# Patient Record
Sex: MALE | Race: WHITE | NOT HISPANIC OR LATINO | Employment: FULL TIME | URBAN - METROPOLITAN AREA
[De-identification: names, ages, dates, MRNs, and addresses within clinical notes are randomized per-mention and may not be internally consistent; named-entity substitution may affect disease eponyms.]

---

## 2024-09-09 ENCOUNTER — HOSPITAL ENCOUNTER (OUTPATIENT)
Facility: HOSPITAL | Age: 44
Setting detail: OBSERVATION
Discharge: HOME | End: 2024-09-11
Attending: STUDENT IN AN ORGANIZED HEALTH CARE EDUCATION/TRAINING PROGRAM | Admitting: STUDENT IN AN ORGANIZED HEALTH CARE EDUCATION/TRAINING PROGRAM
Payer: COMMERCIAL

## 2024-09-09 DIAGNOSIS — J01.40 ACUTE NON-RECURRENT PANSINUSITIS: ICD-10-CM

## 2024-09-09 DIAGNOSIS — J32.4 PANSINUSITIS, UNSPECIFIED CHRONICITY: ICD-10-CM

## 2024-09-09 DIAGNOSIS — I16.0 HYPERTENSIVE URGENCY: Primary | ICD-10-CM

## 2024-09-09 DIAGNOSIS — H60.393 OTHER INFECTIVE ACUTE OTITIS EXTERNA OF BOTH EARS: ICD-10-CM

## 2024-09-09 LAB
ALBUMIN SERPL-MCNC: 4.5 G/DL (ref 3.5–5.2)
ALBUMIN/GLOB SERPL: 1.3 G/DL
ALP SERPL-CCNC: 86 U/L (ref 39–117)
ALT SERPL W P-5'-P-CCNC: 52 U/L (ref 1–41)
ANION GAP SERPL CALCULATED.3IONS-SCNC: 13.9 MMOL/L (ref 5–15)
AST SERPL-CCNC: 50 U/L (ref 1–40)
B PARAPERT DNA SPEC QL NAA+PROBE: NOT DETECTED
B PERT DNA SPEC QL NAA+PROBE: NOT DETECTED
BACTERIA UR QL AUTO: NORMAL /HPF
BASOPHILS # BLD AUTO: 0.03 10*3/MM3 (ref 0–0.2)
BASOPHILS NFR BLD AUTO: 0.3 % (ref 0–1.5)
BILIRUB SERPL-MCNC: 0.6 MG/DL (ref 0–1.2)
BILIRUB UR QL STRIP: NEGATIVE
BUN SERPL-MCNC: 11 MG/DL (ref 6–20)
BUN/CREAT SERPL: 9.9 (ref 7–25)
C PNEUM DNA NPH QL NAA+NON-PROBE: NOT DETECTED
CALCIUM SPEC-SCNC: 9.7 MG/DL (ref 8.6–10.5)
CHLORIDE SERPL-SCNC: 101 MMOL/L (ref 98–107)
CLARITY UR: CLEAR
CO2 SERPL-SCNC: 24.1 MMOL/L (ref 22–29)
COLOR UR: YELLOW
CREAT SERPL-MCNC: 1.11 MG/DL (ref 0.76–1.27)
DEPRECATED RDW RBC AUTO: 36.9 FL (ref 37–54)
EGFRCR SERPLBLD CKD-EPI 2021: 84.5 ML/MIN/1.73
EOSINOPHIL # BLD AUTO: 0.09 10*3/MM3 (ref 0–0.4)
EOSINOPHIL NFR BLD AUTO: 0.8 % (ref 0.3–6.2)
ERYTHROCYTE [DISTWIDTH] IN BLOOD BY AUTOMATED COUNT: 12.1 % (ref 12.3–15.4)
FLUAV SUBTYP SPEC NAA+PROBE: NOT DETECTED
FLUBV RNA ISLT QL NAA+PROBE: NOT DETECTED
GLOBULIN UR ELPH-MCNC: 3.4 GM/DL
GLUCOSE SERPL-MCNC: 101 MG/DL (ref 65–99)
GLUCOSE UR STRIP-MCNC: NEGATIVE MG/DL
HADV DNA SPEC NAA+PROBE: NOT DETECTED
HCOV 229E RNA SPEC QL NAA+PROBE: NOT DETECTED
HCOV HKU1 RNA SPEC QL NAA+PROBE: NOT DETECTED
HCOV NL63 RNA SPEC QL NAA+PROBE: NOT DETECTED
HCOV OC43 RNA SPEC QL NAA+PROBE: NOT DETECTED
HCT VFR BLD AUTO: 48.4 % (ref 37.5–51)
HGB BLD-MCNC: 16.9 G/DL (ref 13–17.7)
HGB UR QL STRIP.AUTO: ABNORMAL
HMPV RNA NPH QL NAA+NON-PROBE: NOT DETECTED
HOLD SPECIMEN: NORMAL
HOLD SPECIMEN: NORMAL
HPIV1 RNA ISLT QL NAA+PROBE: NOT DETECTED
HPIV2 RNA SPEC QL NAA+PROBE: NOT DETECTED
HPIV3 RNA NPH QL NAA+PROBE: NOT DETECTED
HPIV4 P GENE NPH QL NAA+PROBE: NOT DETECTED
HYALINE CASTS UR QL AUTO: NORMAL /LPF
IMM GRANULOCYTES # BLD AUTO: 0.03 10*3/MM3 (ref 0–0.05)
IMM GRANULOCYTES NFR BLD AUTO: 0.3 % (ref 0–0.5)
KETONES UR QL STRIP: NEGATIVE
LEUKOCYTE ESTERASE UR QL STRIP.AUTO: NEGATIVE
LYMPHOCYTES # BLD AUTO: 2.15 10*3/MM3 (ref 0.7–3.1)
LYMPHOCYTES NFR BLD AUTO: 19.9 % (ref 19.6–45.3)
M PNEUMO IGG SER IA-ACNC: NOT DETECTED
MAGNESIUM SERPL-MCNC: 1.9 MG/DL (ref 1.6–2.6)
MCH RBC QN AUTO: 29.6 PG (ref 26.6–33)
MCHC RBC AUTO-ENTMCNC: 34.9 G/DL (ref 31.5–35.7)
MCV RBC AUTO: 84.8 FL (ref 79–97)
MONOCYTES # BLD AUTO: 0.58 10*3/MM3 (ref 0.1–0.9)
MONOCYTES NFR BLD AUTO: 5.4 % (ref 5–12)
NEUTROPHILS NFR BLD AUTO: 7.93 10*3/MM3 (ref 1.7–7)
NEUTROPHILS NFR BLD AUTO: 73.3 % (ref 42.7–76)
NITRITE UR QL STRIP: NEGATIVE
NRBC BLD AUTO-RTO: 0 /100 WBC (ref 0–0.2)
NT-PROBNP SERPL-MCNC: 92.3 PG/ML (ref 0–450)
PH UR STRIP.AUTO: 6.5 [PH] (ref 5–8)
PLATELET # BLD AUTO: 287 10*3/MM3 (ref 140–450)
PMV BLD AUTO: 9.2 FL (ref 6–12)
POTASSIUM SERPL-SCNC: 3.6 MMOL/L (ref 3.5–5.2)
PROT SERPL-MCNC: 7.9 G/DL (ref 6–8.5)
PROT UR QL STRIP: NEGATIVE
RBC # BLD AUTO: 5.71 10*6/MM3 (ref 4.14–5.8)
RBC # UR STRIP: NORMAL /HPF
REF LAB TEST METHOD: NORMAL
RHINOVIRUS RNA SPEC NAA+PROBE: NOT DETECTED
RSV RNA NPH QL NAA+NON-PROBE: NOT DETECTED
SARS-COV-2 RNA NPH QL NAA+NON-PROBE: NOT DETECTED
SODIUM SERPL-SCNC: 139 MMOL/L (ref 136–145)
SP GR UR STRIP: <=1.005 (ref 1–1.03)
SQUAMOUS #/AREA URNS HPF: NORMAL /HPF
TSH SERPL DL<=0.05 MIU/L-ACNC: 1.7 UIU/ML (ref 0.27–4.2)
UROBILINOGEN UR QL STRIP: ABNORMAL
WBC # UR STRIP: NORMAL /HPF
WBC NRBC COR # BLD AUTO: 10.81 10*3/MM3 (ref 3.4–10.8)
WHOLE BLOOD HOLD COAG: NORMAL
WHOLE BLOOD HOLD SPECIMEN: NORMAL

## 2024-09-09 PROCEDURE — 84484 ASSAY OF TROPONIN QUANT: CPT | Performed by: NURSE PRACTITIONER

## 2024-09-09 PROCEDURE — 83880 ASSAY OF NATRIURETIC PEPTIDE: CPT

## 2024-09-09 PROCEDURE — 83735 ASSAY OF MAGNESIUM: CPT

## 2024-09-09 PROCEDURE — 36415 COLL VENOUS BLD VENIPUNCTURE: CPT

## 2024-09-09 PROCEDURE — 96375 TX/PRO/DX INJ NEW DRUG ADDON: CPT

## 2024-09-09 PROCEDURE — 99285 EMERGENCY DEPT VISIT HI MDM: CPT

## 2024-09-09 PROCEDURE — 0202U NFCT DS 22 TRGT SARS-COV-2: CPT

## 2024-09-09 PROCEDURE — 93005 ELECTROCARDIOGRAM TRACING: CPT | Performed by: STUDENT IN AN ORGANIZED HEALTH CARE EDUCATION/TRAINING PROGRAM

## 2024-09-09 PROCEDURE — 25010000002 LABETALOL 5 MG/ML SOLUTION

## 2024-09-09 PROCEDURE — 80050 GENERAL HEALTH PANEL: CPT

## 2024-09-09 PROCEDURE — 93005 ELECTROCARDIOGRAM TRACING: CPT

## 2024-09-09 PROCEDURE — 96376 TX/PRO/DX INJ SAME DRUG ADON: CPT

## 2024-09-09 PROCEDURE — 81001 URINALYSIS AUTO W/SCOPE: CPT

## 2024-09-09 RX ORDER — LABETALOL HYDROCHLORIDE 5 MG/ML
10 INJECTION, SOLUTION INTRAVENOUS ONCE
Status: COMPLETED | OUTPATIENT
Start: 2024-09-09 | End: 2024-09-09

## 2024-09-09 RX ORDER — SODIUM CHLORIDE 0.9 % (FLUSH) 0.9 %
10 SYRINGE (ML) INJECTION AS NEEDED
Status: DISCONTINUED | OUTPATIENT
Start: 2024-09-09 | End: 2024-09-11 | Stop reason: HOSPADM

## 2024-09-09 RX ORDER — LABETALOL HYDROCHLORIDE 5 MG/ML
20 INJECTION, SOLUTION INTRAVENOUS ONCE
Status: DISCONTINUED | OUTPATIENT
Start: 2024-09-09 | End: 2024-09-09

## 2024-09-09 RX ADMIN — LABETALOL HYDROCHLORIDE 10 MG: 5 INJECTION, SOLUTION INTRAVENOUS at 22:53

## 2024-09-09 RX ADMIN — LABETALOL HYDROCHLORIDE 10 MG: 5 INJECTION, SOLUTION INTRAVENOUS at 21:37

## 2024-09-09 NOTE — Clinical Note
Level of Care: Telemetry [5]   Admitting Physician: IKE QUICK [[id number]]   Attending Physician: IKE QUICK [[id number]]

## 2024-09-10 ENCOUNTER — APPOINTMENT (OUTPATIENT)
Dept: CARDIOLOGY | Facility: HOSPITAL | Age: 44
End: 2024-09-10
Payer: COMMERCIAL

## 2024-09-10 PROBLEM — I16.0 HYPERTENSIVE URGENCY: Status: ACTIVE | Noted: 2024-09-10

## 2024-09-10 LAB
AORTIC DIMENSIONLESS INDEX: 0.84 (DI)
BH CV ECHO MEAS - ACS: 2 CM
BH CV ECHO MEAS - AO MAX PG: 18.8 MMHG
BH CV ECHO MEAS - AO MEAN PG: 10 MMHG
BH CV ECHO MEAS - AO V2 MAX: 217 CM/SEC
BH CV ECHO MEAS - AO V2 VTI: 35 CM
BH CV ECHO MEAS - AVA(I,D): 2.6 CM2
BH CV ECHO MEAS - EDV(CUBED): 117.6 ML
BH CV ECHO MEAS - EDV(MOD-SP4): 146 ML
BH CV ECHO MEAS - EF(MOD-BP): 54 %
BH CV ECHO MEAS - EF(MOD-SP4): 53.8 %
BH CV ECHO MEAS - ESV(CUBED): 29.8 ML
BH CV ECHO MEAS - ESV(MOD-SP4): 67.4 ML
BH CV ECHO MEAS - FS: 36.7 %
BH CV ECHO MEAS - IVS/LVPW: 1.1 CM
BH CV ECHO MEAS - IVSD: 1.1 CM
BH CV ECHO MEAS - LA DIMENSION: 3.9 CM
BH CV ECHO MEAS - LAT PEAK E' VEL: 8.2 CM/SEC
BH CV ECHO MEAS - LV DIASTOLIC VOL/BSA (35-75): 70.4 CM2
BH CV ECHO MEAS - LV MASS(C)D: 188.1 GRAMS
BH CV ECHO MEAS - LV MAX PG: 13.2 MMHG
BH CV ECHO MEAS - LV MEAN PG: 7 MMHG
BH CV ECHO MEAS - LV SYSTOLIC VOL/BSA (12-30): 32.5 CM2
BH CV ECHO MEAS - LV V1 MAX: 182 CM/SEC
BH CV ECHO MEAS - LV V1 VTI: 31.8 CM
BH CV ECHO MEAS - LVIDD: 4.9 CM
BH CV ECHO MEAS - LVIDS: 3.1 CM
BH CV ECHO MEAS - LVOT AREA: 2.8 CM2
BH CV ECHO MEAS - LVOT DIAM: 1.9 CM
BH CV ECHO MEAS - LVPWD: 1 CM
BH CV ECHO MEAS - MED PEAK E' VEL: 7.2 CM/SEC
BH CV ECHO MEAS - MV A MAX VEL: 96.1 CM/SEC
BH CV ECHO MEAS - MV DEC SLOPE: 349 CM/SEC2
BH CV ECHO MEAS - MV DEC TIME: 0.19 SEC
BH CV ECHO MEAS - MV E MAX VEL: 61 CM/SEC
BH CV ECHO MEAS - MV E/A: 0.63
BH CV ECHO MEAS - MV MAX PG: 4.6 MMHG
BH CV ECHO MEAS - MV MEAN PG: 2 MMHG
BH CV ECHO MEAS - MV P1/2T: 87.3 MSEC
BH CV ECHO MEAS - MV V2 VTI: 26.9 CM
BH CV ECHO MEAS - MVA(P1/2T): 2.5 CM2
BH CV ECHO MEAS - MVA(VTI): 3.4 CM2
BH CV ECHO MEAS - PA ACC TIME: 0.12 SEC
BH CV ECHO MEAS - PA V2 MAX: 150 CM/SEC
BH CV ECHO MEAS - RV MAX PG: 4.6 MMHG
BH CV ECHO MEAS - RV V1 MAX: 107 CM/SEC
BH CV ECHO MEAS - RV V1 VTI: 18.3 CM
BH CV ECHO MEAS - RVDD: 3.9 CM
BH CV ECHO MEAS - SV(LVOT): 90.2 ML
BH CV ECHO MEAS - SV(MOD-SP4): 78.6 ML
BH CV ECHO MEAS - SVI(LVOT): 43.5 ML/M2
BH CV ECHO MEAS - SVI(MOD-SP4): 37.9 ML/M2
BH CV ECHO MEAS - TAPSE (>1.6): 2.34 CM
BH CV ECHO MEASUREMENTS AVERAGE E/E' RATIO: 7.92
BH CV XLRA - TDI S': 23 CM/SEC
LEFT ATRIUM VOLUME INDEX: 25.7 ML/M2
POTASSIUM SERPL-SCNC: 3.7 MMOL/L (ref 3.5–5.2)
QT INTERVAL: 379 MS
QTC INTERVAL: 486 MS
SINUS: 2.9 CM
STJ: 2.6 CM
TROPONIN T SERPL HS-MCNC: 13 NG/L

## 2024-09-10 PROCEDURE — G0378 HOSPITAL OBSERVATION PER HR: HCPCS

## 2024-09-10 PROCEDURE — 93306 TTE W/DOPPLER COMPLETE: CPT | Performed by: INTERNAL MEDICINE

## 2024-09-10 PROCEDURE — 93306 TTE W/DOPPLER COMPLETE: CPT

## 2024-09-10 PROCEDURE — 25010000002 NICARDIPINE 2.5 MG/ML SOLUTION 10 ML VIAL

## 2024-09-10 PROCEDURE — 96365 THER/PROPH/DIAG IV INF INIT: CPT

## 2024-09-10 PROCEDURE — 96366 THER/PROPH/DIAG IV INF ADDON: CPT

## 2024-09-10 PROCEDURE — 25810000003 SODIUM CHLORIDE 0.9 % SOLUTION 250 ML FLEX CONT

## 2024-09-10 PROCEDURE — 84132 ASSAY OF SERUM POTASSIUM: CPT | Performed by: INTERNAL MEDICINE

## 2024-09-10 RX ORDER — ONDANSETRON 2 MG/ML
4 INJECTION INTRAMUSCULAR; INTRAVENOUS EVERY 6 HOURS PRN
Status: DISCONTINUED | OUTPATIENT
Start: 2024-09-10 | End: 2024-09-11 | Stop reason: HOSPADM

## 2024-09-10 RX ORDER — HYDRALAZINE HYDROCHLORIDE 25 MG/1
25 TABLET, FILM COATED ORAL EVERY 8 HOURS SCHEDULED
Status: DISCONTINUED | OUTPATIENT
Start: 2024-09-10 | End: 2024-09-10

## 2024-09-10 RX ORDER — POTASSIUM CHLORIDE 1500 MG/1
40 TABLET, EXTENDED RELEASE ORAL EVERY 4 HOURS
Status: COMPLETED | OUTPATIENT
Start: 2024-09-10 | End: 2024-09-10

## 2024-09-10 RX ORDER — LOSARTAN POTASSIUM 50 MG/1
50 TABLET ORAL
Status: DISCONTINUED | OUTPATIENT
Start: 2024-09-10 | End: 2024-09-11 | Stop reason: HOSPADM

## 2024-09-10 RX ORDER — SODIUM CHLORIDE 0.9 % (FLUSH) 0.9 %
10 SYRINGE (ML) INJECTION AS NEEDED
Status: DISCONTINUED | OUTPATIENT
Start: 2024-09-10 | End: 2024-09-11 | Stop reason: HOSPADM

## 2024-09-10 RX ORDER — ONDANSETRON 4 MG/1
4 TABLET, ORALLY DISINTEGRATING ORAL EVERY 6 HOURS PRN
Status: DISCONTINUED | OUTPATIENT
Start: 2024-09-10 | End: 2024-09-11 | Stop reason: HOSPADM

## 2024-09-10 RX ORDER — POLYETHYLENE GLYCOL 3350 17 G/17G
17 POWDER, FOR SOLUTION ORAL DAILY PRN
Status: DISCONTINUED | OUTPATIENT
Start: 2024-09-10 | End: 2024-09-11 | Stop reason: HOSPADM

## 2024-09-10 RX ORDER — METOPROLOL SUCCINATE 50 MG/1
50 TABLET, EXTENDED RELEASE ORAL
Status: DISCONTINUED | OUTPATIENT
Start: 2024-09-10 | End: 2024-09-11 | Stop reason: HOSPADM

## 2024-09-10 RX ORDER — AMOXICILLIN 250 MG
2 CAPSULE ORAL 2 TIMES DAILY PRN
Status: DISCONTINUED | OUTPATIENT
Start: 2024-09-10 | End: 2024-09-11 | Stop reason: HOSPADM

## 2024-09-10 RX ORDER — ALUMINA, MAGNESIA, AND SIMETHICONE 2400; 2400; 240 MG/30ML; MG/30ML; MG/30ML
15 SUSPENSION ORAL EVERY 6 HOURS PRN
Status: DISCONTINUED | OUTPATIENT
Start: 2024-09-10 | End: 2024-09-11 | Stop reason: HOSPADM

## 2024-09-10 RX ORDER — BISACODYL 10 MG
10 SUPPOSITORY, RECTAL RECTAL DAILY PRN
Status: DISCONTINUED | OUTPATIENT
Start: 2024-09-10 | End: 2024-09-11 | Stop reason: HOSPADM

## 2024-09-10 RX ORDER — OXYMETAZOLINE HYDROCHLORIDE 0.05 G/100ML
2 SPRAY NASAL 2 TIMES DAILY
Status: DISCONTINUED | OUTPATIENT
Start: 2024-09-10 | End: 2024-09-11 | Stop reason: HOSPADM

## 2024-09-10 RX ORDER — ACETAMINOPHEN 325 MG/1
650 TABLET ORAL EVERY 6 HOURS PRN
Status: DISCONTINUED | OUTPATIENT
Start: 2024-09-10 | End: 2024-09-11 | Stop reason: HOSPADM

## 2024-09-10 RX ORDER — SODIUM CHLORIDE 0.9 % (FLUSH) 0.9 %
10 SYRINGE (ML) INJECTION EVERY 12 HOURS SCHEDULED
Status: DISCONTINUED | OUTPATIENT
Start: 2024-09-10 | End: 2024-09-11 | Stop reason: HOSPADM

## 2024-09-10 RX ORDER — AMLODIPINE BESYLATE 5 MG/1
5 TABLET ORAL
Status: DISCONTINUED | OUTPATIENT
Start: 2024-09-10 | End: 2024-09-11 | Stop reason: HOSPADM

## 2024-09-10 RX ORDER — SODIUM CHLORIDE 9 MG/ML
40 INJECTION, SOLUTION INTRAVENOUS AS NEEDED
Status: DISCONTINUED | OUTPATIENT
Start: 2024-09-10 | End: 2024-09-11 | Stop reason: HOSPADM

## 2024-09-10 RX ORDER — BISACODYL 5 MG/1
5 TABLET, DELAYED RELEASE ORAL DAILY PRN
Status: DISCONTINUED | OUTPATIENT
Start: 2024-09-10 | End: 2024-09-11 | Stop reason: HOSPADM

## 2024-09-10 RX ADMIN — METOPROLOL SUCCINATE 50 MG: 50 TABLET, EXTENDED RELEASE ORAL at 16:12

## 2024-09-10 RX ADMIN — POTASSIUM CHLORIDE 40 MEQ: 1500 TABLET, EXTENDED RELEASE ORAL at 04:43

## 2024-09-10 RX ADMIN — NICARDIPINE HYDROCHLORIDE 7.5 MG/HR: 25 INJECTION, SOLUTION INTRAVENOUS at 13:35

## 2024-09-10 RX ADMIN — NICARDIPINE HYDROCHLORIDE 10 MG/HR: 25 INJECTION, SOLUTION INTRAVENOUS at 02:39

## 2024-09-10 RX ADMIN — NICARDIPINE HYDROCHLORIDE 10 MG/HR: 25 INJECTION, SOLUTION INTRAVENOUS at 04:43

## 2024-09-10 RX ADMIN — Medication 10 ML: at 08:30

## 2024-09-10 RX ADMIN — OXYMETAZOLINE HYDROCHLORIDE 2 SPRAY: 0.5 SPRAY NASAL at 21:27

## 2024-09-10 RX ADMIN — OXYMETAZOLINE HYDROCHLORIDE 2 SPRAY: 0.5 SPRAY NASAL at 13:10

## 2024-09-10 RX ADMIN — Medication 10 ML: at 04:45

## 2024-09-10 RX ADMIN — NICARDIPINE HYDROCHLORIDE 7.5 MG/HR: 25 INJECTION, SOLUTION INTRAVENOUS at 09:48

## 2024-09-10 RX ADMIN — POTASSIUM CHLORIDE 40 MEQ: 1500 TABLET, EXTENDED RELEASE ORAL at 08:30

## 2024-09-10 RX ADMIN — NICARDIPINE HYDROCHLORIDE 5 MG/HR: 25 INJECTION, SOLUTION INTRAVENOUS at 18:24

## 2024-09-10 RX ADMIN — AMLODIPINE BESYLATE 5 MG: 5 TABLET ORAL at 08:49

## 2024-09-10 RX ADMIN — AMOXICILLIN AND CLAVULANATE POTASSIUM 1 TABLET: 875; 125 TABLET, FILM COATED ORAL at 08:30

## 2024-09-10 RX ADMIN — NICARDIPINE HYDROCHLORIDE 5 MG/HR: 25 INJECTION, SOLUTION INTRAVENOUS at 00:11

## 2024-09-10 RX ADMIN — ACETAMINOPHEN 650 MG: 325 TABLET, FILM COATED ORAL at 16:46

## 2024-09-10 RX ADMIN — LOSARTAN POTASSIUM 50 MG: 50 TABLET, FILM COATED ORAL at 08:49

## 2024-09-10 RX ADMIN — Medication 10 ML: at 21:27

## 2024-09-10 RX ADMIN — AMOXICILLIN AND CLAVULANATE POTASSIUM 1 TABLET: 875; 125 TABLET, FILM COATED ORAL at 21:26

## 2024-09-10 NOTE — PLAN OF CARE
Goal Outcome Evaluation:      Patient arrived to the unit around 0130 on a Cardene drip. Patient has done well through the night with no complaints of pain or headaches. Pt a/ox4, 2L NC when sleeping, and Cardene gtt currently infusing at 5mg/hr with systolics in the 120's

## 2024-09-10 NOTE — H&P
[facility]    Hospitalist History and Physical     Smith Fletcher : 1980 MRN:[id number] LOS:0 ROOM:      Reason for admission: Hypertensive urgency     Assessment / Plan     Hypertensive urgency  - BP initially 246/129  - given 2 doses of labetalol without improvement  - cardene drip started in the ED  - EKG NSR  - check troponin, check echo     Mildly elevated liver enzymes  - AST 50, ALT 52  - will need outpatient monitoring    Sinusitis  - WBC 10.81  - on augmentin    Level Of Support Discussed With: Patient  Code Status (Patient has no pulse and is not breathing): CPR (Attempt to Resuscitate)  Medical Interventions (Patient has pulse or is breathing): Full Support       Nutrition:   Diet: Regular/House; Fluid Consistency: Thin (IDDSI 0)     VTE Prophylaxis:  Mechanical VTE prophylaxis orders are present.         History of Present illness     Smith Fletcher is a 44 y.o. male with no past medical history who does not see a PCP presented to urgent care with frontal sinus headache and congestion and was found to be hypertensive. He denied any dizziness, chest pain, shortness of breath. He has no history of hypertension but both his mother and father have hypertension. He denied any smoking, alcohol use, or drug use.     In the ED patient was found to be hypertensive 246/129. He was given labetalol 10mg x2 doses with little to no improvement 200/117. He was started on a cardene drip and admitted for further treatment of hypertensive urgency.     Subjective / Review of systems     Review of Systems   Constitutional: Negative.    HENT:  Positive for congestion, postnasal drip, sinus pressure and sinus pain.    Eyes: Negative.    Respiratory: Negative.     Cardiovascular: Negative.    Gastrointestinal: Negative.    Genitourinary: Negative.    Musculoskeletal: Negative.    Skin: Negative.    Neurological:  Positive for headaches.   Psychiatric/Behavioral: Negative.          Past  Medical/Surgical/Social/Family History & Allergies     No past medical history on file.   Past Surgical History:   Procedure Laterality Date    WISDOM TOOTH EXTRACTION        Social History     Socioeconomic History    Marital status:    Tobacco Use    Smoking status: Never     Passive exposure: Never    Smokeless tobacco: Never   Vaping Use    Vaping status: Never Used   Substance and Sexual Activity    Alcohol use: Never    Drug use: Never    Sexual activity: Defer      No family history on file.   Allergies   Allergen Reactions    Codeine Nausea And Vomiting      Social Determinants of Health     Tobacco Use: Low Risk  (9/9/2024)    Patient History     Smoking Tobacco Use: Never     Smokeless Tobacco Use: Never     Passive Exposure: Never   Alcohol Use: Not on file   Financial Resource Strain: Not on file   Food Insecurity: Not on file   Transportation Needs: Not on file   Physical Activity: Not on file   Stress: Not on file   Social Connections: Unknown (9/10/2024)    Family and Community Support     Help with Day-to-Day Activities: Not on file     Lonely or Isolated: Not on file   Interpersonal Safety: Not At Risk (9/9/2024)    Abuse Screen     Unsafe at Home or Work/School: no     Feels Threatened by Someone?: no     Does Anyone Keep You from Contacting Others or Doint Things Outside the Home?: no     Physical Sign of Abuse Present: no   Depression: Not on file   Housing Stability: Unknown (9/10/2024)    Housing Stability     Current Living Arrangements: Not on file     Potentially Unsafe Housing Conditions: Not on file   Utilities: Not on file   Health Literacy: Unknown (9/10/2024)    Education     Help with school or training?: Not on file     Preferred Language: Not on file   Employment: Unknown (9/10/2024)    Employment     Do you want help finding or keeping work or a job?: Not on file   Disabilities: Unknown (9/10/2024)    Disabilities     Concentrating, Remembering, or Making Decisions  Difficulty: Not on file     Doing Errands Independently Difficulty: Not on file        Home Medications     Prior to Admission medications    Medication Sig Start Date End Date Taking? Authorizing Provider   amoxicillin-clavulanate (AUGMENTIN) 875-125 MG per tablet Take 1 tablet by mouth 2 (Two) Times a Day for 10 days. 9/9/24 9/19/24  Jess Domingo APRN        Objective / Physical Exam     Vital signs:  Temp: 98.3 °F (36.8 °C)  BP: (!) 191/113  Heart Rate: 89  Resp: 18  SpO2: 97 %  Weight: 98.9 kg (218 lb 0.6 oz)    Admission Weight: Weight: 98.9 kg (218 lb 0.6 oz)    Physical Exam  Vitals and nursing note reviewed.   HENT:      Head: Normocephalic and atraumatic.   Eyes:      Extraocular Movements: Extraocular movements intact.      Pupils: Pupils are equal, round, and reactive to light.   Cardiovascular:      Rate and Rhythm: Normal rate and regular rhythm.      Pulses: Normal pulses.      Heart sounds: Normal heart sounds.   Pulmonary:      Effort: Pulmonary effort is normal.      Breath sounds: Normal breath sounds.   Abdominal:      General: Bowel sounds are normal.      Palpations: Abdomen is soft.      Tenderness: There is no abdominal tenderness.   Musculoskeletal:         General: Normal range of motion.   Skin:     General: Skin is warm and dry.   Neurological:      Mental Status: He is alert and oriented to person, place, and time.   Psychiatric:         Mood and Affect: Mood normal.         Behavior: Behavior normal.          Labs     Results from last 7 days   Lab Units 09/09/24 2032   WBC 10*3/mm3 10.81*   HEMOGLOBIN g/dL 16.9   HEMATOCRIT % 48.4   PLATELETS 10*3/mm3 287      Results from last 7 days   Lab Units 09/09/24 2032   ALK PHOS U/L 86   AST (SGOT) U/L 50*   ALT (SGPT) U/L 52*           Results from last 7 days   Lab Units 09/09/24 2032   SODIUM mmol/L 139   POTASSIUM mmol/L 3.6   CHLORIDE mmol/L 101   CO2 mmol/L 24.1   BUN mg/dL 11   CREATININE mg/dL 1.11   GLUCOSE mg/dL 101*         Imaging     XR Chest 2 View    Result Date: 9/9/2024  XR CHEST 2 VW Date of Exam: 9/9/2024 6:13 PM EDT Indication: cough with wheezing Comparison: None available. Findings: The lungs are clear. The heart and mediastinal contours appear normal. There is no pleural effusion. The pulmonary vasculature appears normal. The osseous structures appear intact.     Impression: No acute cardiopulmonary process. Electronically Signed: Lev Zaldivar MD  9/9/2024 6:53 PM EDT  Workstation ID: [id number]        Current Medications     Scheduled Meds:  amoxicillin-clavulanate, 1 tablet, Oral, BID  sodium chloride, 10 mL, Intravenous, Q12H         Continuous Infusions:  niCARdipine, 5-15 mg/hr, Last Rate: 7.5 mg/hr (09/10/24 0105)           Leilani Esposito [facility]  09/10/24   01:31 EDT

## 2024-09-10 NOTE — PLAN OF CARE
Goal Outcome Evaluation:      Pt resting with VSS. Requested tylenol at 16:45 d/t headache that worsened with coughing. Tylenol administered, dr notified of slightly elevated ALT and AST levels. Pt on cardene gtt infusing at 5 which is down from 7.5 previously during the shift. Multiple HTN meds were added to pt med list and include amlodipine, metoprolol succinate XL and losartan. Pt newest BP is 130/87. He had an echo completed this morning.                                           (0) swallows foods/liquids without difficulty

## 2024-09-10 NOTE — PROGRESS NOTES
[facility] MEDICINE SERVICE  DAILY PROGRESS NOTE    NAME: Smith Fletcher  : 1980  MRN: [id number]      LOS: 0 days     PROVIDER OF SERVICE: Jun Hardy MD    Chief Complaint: Hypertensive urgency    Subjective:     Interval History: Patient states that overall he feels better although he still has significant sinus congestion and head pressure.  Denies any chest pain or shortness of breath currently.        Review of Systems:   Review of Systems    Objective:     Vital Signs  Temp:  [97.3 °F (36.3 °C)-98.4 °F (36.9 °C)] 98.2 °F (36.8 °C)  Heart Rate:  [] 79  Resp:  [15-20] 18  BP: (152-246)/() 152/90  Flow (L/min):  [2] 2   Body mass index is 35.19 kg/m².    Physical Exam  Physical Exam  General Appearance:  Alert, cooperative, no distress, appears stated age  Head:  Normocephalic, without obvious abnormality, atraumatic  Eyes:  PERRL, conjunctiva/corneas clear, EOM's intact, fundi benign, both eyes  Ears:  Normal TM's and external ear canals, both ears  Nose: Nares normal, septum midline, mucosa normal, no drainage or sinus tenderness  Throat: Lips, mucosa, and tongue normal; teeth and gums normal  Neck: Supple, symmetrical, trachea midline, no adenopathy, thyroid: not enlarged, symmetric, no tenderness/mass/nodules, no carotid bruit or JVD  Lungs:   Clear to auscultation bilaterally, respirations unlabored  Heart:  Regular rate and rhythm, S1, S2 normal, no murmur, rub or gallop  Abdomen:  Soft, non-tender, bowel sounds active all four quadrants,  no masses, no organomegaly  Extremities: Extremities normal, atraumatic, no cyanosis or edema  Pulses: 2+ and symmetric  Skin: Skin color, texture, turgor normal, no rashes or lesions  Neurologic: Normal         Diagnostic Data    Results from last 7 days   Lab Units 09/10/24  1306 242   WBC 10*3/mm3  --  10.81*   HEMOGLOBIN g/dL  --  16.9   HEMATOCRIT %  --  48.4   PLATELETS 10*3/mm3  --  287   GLUCOSE mg/dL  --  101*    CREATININE mg/dL  --  1.11   BUN mg/dL  --  11   SODIUM mmol/L  --  139   POTASSIUM mmol/L 3.7 3.6   AST (SGOT) U/L  --  50*   ALT (SGPT) U/L  --  52*   ALK PHOS U/L  --  86   BILIRUBIN mg/dL  --  0.6   ANION GAP mmol/L  --  13.9       XR Chest 2 View    Result Date: 9/9/2024  Impression: No acute cardiopulmonary process. Electronically Signed: Lev Zaldivar MD  9/9/2024 6:53 PM EDT  Workstation ID: [id number]       I reviewed the patient's new clinical results.    Assessment/Plan:     Active and Resolved Problems  Active Hospital Problems    Diagnosis  POA    **Hypertensive urgency [I16.0]  Yes      Resolved Hospital Problems   No resolved problems to display.       Uncontrolled hypertension with hypertensive urgency  -Initiated on Cardene drip in the ER for systolic blood pressure over 250  -Will initiate him on oral antiparkinson medications with losartan, amlodipine and possibly Toprol-XL as I anticipate he will need at least 3 medications for BP control  -Wean Cardene drip as tolerated  -Echocardiogram shows preserved EF with diastolic dysfunction    Mildly elevated transaminases  -Possibly related to hypertension versus fatty liver disease  -No acute issues at this time although would benefit from outpatient monitoring    Acute sinusitis  -Started on oral Augmentin  -Provide patient with Afrin nasal spray for decongestion        VTE Prophylaxis:  Mechanical VTE prophylaxis orders are present.             Disposition Planning:     Barriers to Discharge: Control hypertension and wean off Cardene drip  Anticipated Date of Discharge: 9/11  Place of Discharge: Home      Time: 40 minutes     Code Status and Medical Interventions: CPR (Attempt to Resuscitate); Full Support   Ordered at: 09/10/24 0130     Level Of Support Discussed With:    Patient     Code Status (Patient has no pulse and is not breathing):    CPR (Attempt to Resuscitate)     Medical Interventions (Patient has pulse or is breathing):    Full  Support       Signature: Electronically signed by Jun Hardy MD, 09/10/24, 14:09 EDT.  [facility] Hospitalist Team

## 2024-09-10 NOTE — ED NOTES
Nursing report ED to floor  Smith Fletcher  44 y.o.  male    HPI:   Chief Complaint   Patient presents with    Hypertension     Pt was seen at [facility] [place] earlier today for c/o cough, congestion. Pt had vitals taken while there and had very high BP-taken 3 times. Pt denies h/o or taking any meds for BP. Pt had CXR, covid and flu swab done with negative results       Admitting doctor:   Jonathan Medina DO    Admitting diagnosis:   The primary encounter diagnosis was Hypertensive urgency. A diagnosis of Pansinusitis, unspecified chronicity was also pertinent to this visit.    Code status:   Current Code Status       Date Active Code Status Order ID Comments User Context       9/10/2024 0130 CPR (Attempt to Resuscitate) [id number]  Leilani Esposito APRN ED        Question Answer    Code Status (Patient has no pulse and is not breathing) CPR (Attempt to Resuscitate)    Medical Interventions (Patient has pulse or is breathing) Full Support    Level Of Support Discussed With Patient                    Allergies:   Codeine    Isolation:  No active isolations     Fall Risk:  Fall Risk Assessment was completed, and patient is at low risk for falls.   Predictive Model Details         5 (Low) Factor Value    Calculated 9/10/2024 01:51 Age 44    Risk of Fall Model Active Peripheral IV Present     Respiratory Rate 18     Magnesium 1.9 mg/dL     Number of Distinct Medication Classes administered 4     Goldy Scale not on file     Clinically Relevant Sex Not Female     Chloride 101 mmol/L     ALT 52 U/L     Total Bilirubin 0.6 mg/dL     Days after Admission 0.239     Diastolic      Creatinine 1.11 mg/dL     Albumin 4.5 g/dL     Calcium 9.7 mg/dL     Potassium 3.6 mmol/L         Weight:       09/09/24 1958   Weight: 98.9 kg (218 lb 0.6 oz)       Intake and Output  No intake or output data in the 24 hours ending 09/10/24 0159    Diet:   Dietary Orders (From admission, onward)       Start     Ordered    09/10/24 0130  Diet:  Regular/House; Fluid Consistency: Thin (IDDSI 0)  Diet Effective Now        References:    Diet Order Crosswalk   Question Answer Comment   Diets: Regular/House    Fluid Consistency: Thin (IDDSI 0)        09/10/24 0130                     Most recent vitals:   Vitals:    09/09/24 2316 09/10/24 0016 09/10/24 0101 09/10/24 0102   BP: (!) 212/123 (!) 214/125 (!) 191/113    BP Location:   Right arm    Patient Position:   Sitting    Pulse: 74 73 89    Resp:   18    Temp:       TempSrc:       SpO2: 97% 98% 97% 97%   Weight:       Height:           Active LDAs/IV Access:   Lines, Drains & Airways       Active LDAs       Name Placement date Placement time Site Days    Peripheral IV 09/09/24 2137 Left Antecubital 09/09/24 2137  Antecubital  less than 1                    Skin Condition:   Skin Assessments (last day)       None             Labs (abnormal labs have a star):   Labs Reviewed   COMPREHENSIVE METABOLIC PANEL - Abnormal; Notable for the following components:       Result Value    Glucose 101 (*)     ALT (SGPT) 52 (*)     AST (SGOT) 50 (*)     All other components within normal limits    Narrative:     GFR Normal >60  Chronic Kidney Disease <60  Kidney Failure <15     URINALYSIS W/ MICROSCOPIC IF INDICATED (NO CULTURE) - Abnormal; Notable for the following components:    Blood, UA Trace (*)     All other components within normal limits   CBC WITH AUTO DIFFERENTIAL - Abnormal; Notable for the following components:    WBC 10.81 (*)     RDW 12.1 (*)     RDW-SD 36.9 (*)     Neutrophils, Absolute 7.93 (*)     All other components within normal limits   RESPIRATORY PANEL PCR W/ COVID-19 (SARS-COV-2), NP SWAB IN UTM/VTP, 2 HR TAT - Normal    Narrative:     In the setting of a positive respiratory panel with a viral infection PLUS a negative procalcitonin without other underlying concern for bacterial infection, consider observing off antibiotics or discontinuation of antibiotics and continue supportive care. If the  respiratory panel is positive for atypical bacterial infection (Bordetella pertussis, Chlamydophila pneumoniae, or Mycoplasma pneumoniae), consider antibiotic de-escalation to target atypical bacterial infection.   BNP (IN-HOUSE) - Normal    Narrative:     This assay is used as an aid in the diagnosis of individuals suspected of having heart failure. It can be used as an aid in the diagnosis of acute decompensated heart failure (ADHF) in patients presenting with signs and symptoms of ADHF to the emergency department (ED). In addition, NT-proBNP of <300 pg/mL indicates ADHF is not likely.    Age Range Result Interpretation  NT-proBNP Concentration (pg/mL:      <50             Positive            >450                   Gray                 300-450                    Negative             <300    50-75           Positive            >900                  Gray                300-900                  Negative            <300      >75             Positive            >1800                  Gray                300-1800                  Negative            <300   MAGNESIUM - Normal   TSH - Normal   TROPONIN - Normal    Narrative:     High Sensitive Troponin T Reference Range:  <14.0 ng/L- Negative Female for AMI  <22.0 ng/L- Negative Male for AMI  >=14 - Abnormal Female indicating possible myocardial injury.  >=22 - Abnormal Male indicating possible myocardial injury.   Clinicians would have to utilize clinical acumen, EKG, Troponin, and serial changes to determine if it is an Acute Myocardial Infarction or myocardial injury due to an underlying chronic condition.        RAINBOW DRAW    Narrative:     The following orders were created for panel order [place] Draw.  Procedure                               Abnormality         Status                     ---------                               -----------         ------                     Green Top (Gel)[[id number]]                                  Final result                Lavender Top[[id number]]                                     Final result               Gold Top - SST[[id number]]                                   Final result               Light Blue Top[[id number]]                                   Final result                 Please view results for these tests on the individual orders.   URINALYSIS, MICROSCOPIC ONLY   HIGH SENSITIVITIY TROPONIN T 2HR   GREEN TOP   LAVENDER TOP   GOLD TOP - SST   LIGHT BLUE TOP   CBC AND DIFFERENTIAL    Narrative:     The following orders were created for panel order CBC & Differential.  Procedure                               Abnormality         Status                     ---------                               -----------         ------                     CBC Auto Differential[[id number]]        Abnormal            Final result                 Please view results for these tests on the individual orders.       LOC: Person, Place, Time, and Situation    Telemetry:  Telemetry    Cardiac Monitoring Ordered: yes    EKG:   ECG 12 Lead Other; HTN   Preliminary Result   HEART RATE=99  bpm   RR Suiuresg=309  ms   TX Huuuixbo=690  ms   P Horizontal Axis=-16  deg   P Front Axis=49  deg   QRSD Lkqwenkc=524  ms   QT Tahglqnk=889  ms   BVrP=695  ms   QRS Axis=68  deg   T Wave Axis=-22  deg   - NORMAL ECG -   Sinus rhythm   Date and Time of Study:2024-09-09 20:03:43      Telemetry Scan   Final Result      Telemetry Scan   Final Result          Medications Given in the ED:   Medications   sodium chloride 0.9 % flush 10 mL (has no administration in time range)   niCARdipine (CARDENE) 25mg in 250mL NS infusion (7.5 mg/hr Intravenous Rate/Dose Change 9/10/24 0105)   amoxicillin-clavulanate (AUGMENTIN) 875-125 MG per tablet 1 tablet (has no administration in time range)   sodium chloride 0.9 % flush 10 mL (has no administration in time range)   sodium chloride 0.9 % flush 10 mL (has no administration in time range)   sodium chloride 0.9 % infusion 40 mL (has no  administration in time range)   aluminum-magnesium hydroxide-simethicone (MAALOX MAX) 400-400-40 MG/5ML suspension 15 mL (has no administration in time range)   ondansetron ODT (ZOFRAN-ODT) disintegrating tablet 4 mg (has no administration in time range)     Or   ondansetron (ZOFRAN) injection 4 mg (has no administration in time range)   Potassium Replacement - Follow Nurse / BPA Driven Protocol (has no administration in time range)   Magnesium Standard Dose Replacement - Follow Nurse / BPA Driven Protocol (has no administration in time range)   Phosphorus Replacement - Follow Nurse / BPA Driven Protocol (has no administration in time range)   Calcium Replacement - Follow Nurse / BPA Driven Protocol (has no administration in time range)   sennosides-docusate (PERICOLACE) 8.6-50 MG per tablet 2 tablet (has no administration in time range)     And   polyethylene glycol (MIRALAX) packet 17 g (has no administration in time range)     And   bisacodyl (DULCOLAX) EC tablet 5 mg (has no administration in time range)     And   bisacodyl (DULCOLAX) suppository 10 mg (has no administration in time range)   melatonin tablet 5 mg (has no administration in time range)   labetalol (NORMODYNE,TRANDATE) injection 10 mg (10 mg Intravenous Given 9/9/24 2137)   labetalol (NORMODYNE,TRANDATE) injection 10 mg (10 mg Intravenous Given 9/9/24 2253)       Imaging results:  XR Chest 2 View    Result Date: 9/9/2024  Impression: No acute cardiopulmonary process. Electronically Signed: Lev Zaldivar MD  9/9/2024 6:53 PM EDT  Workstation ID: [id number]     Social issues:   Social History     Socioeconomic History    Marital status:    Tobacco Use    Smoking status: Never     Passive exposure: Never    Smokeless tobacco: Never   Vaping Use    Vaping status: Never Used   Substance and Sexual Activity    Alcohol use: Never    Drug use: Never    Sexual activity: Defer       NIH Stroke Scale:  Interval: (not recorded)  1a. Level of  Consciousness: (not recorded)  1b. LOC Questions: (not recorded)  1c. LOC Commands: (not recorded)  2. Best Gaze: (not recorded)  3. Visual: (not recorded)  4. Facial Palsy: (not recorded)  5a. Motor Arm, Left: (not recorded)  5b. Motor Arm, Right: (not recorded)  6a. Motor Leg, Left: (not recorded)  6b. Motor Leg, Right: (not recorded)  7. Limb Ataxia: (not recorded)  8. Sensory: (not recorded)  9. Best Language: (not recorded)  10. Dysarthria: (not recorded)  11. Extinction and Inattention (formerly Neglect): (not recorded)    Total (NIH Stroke Scale): (not recorded)     Additional notable assessment information:    ICU nurse, Ivis SABILLON.      Nursing report ED to floor:    ICU nurseIvis RN.    Derrick Clinton LPN   09/10/24 01:59 EDT Nursing report ED to floor  Smith Fletcher  44 y.o.  male    HPI:   Chief Complaint   Patient presents with    Hypertension     Pt was seen at [facility] earlier today for c/o cough, congestion. Pt had vitals taken while there and had very high BP-taken 3 times. Pt denies h/o or taking any meds for BP. Pt had CXR, covid and flu swab done with negative results       Admitting doctor:   Jonathan Medina DO    Admitting diagnosis:   The primary encounter diagnosis was Hypertensive urgency. A diagnosis of Pansinusitis, unspecified chronicity was also pertinent to this visit.    Code status:   Current Code Status       Date Active Code Status Order ID Comments User Context       9/10/2024 0130 CPR (Attempt to Resuscitate) [id number]  Leilani Esposito APRN ED        Question Answer    Code Status (Patient has no pulse and is not breathing) CPR (Attempt to Resuscitate)    Medical Interventions (Patient has pulse or is breathing) Full Support    Level Of Support Discussed With Patient                    Allergies:   Codeine    Isolation:  No active isolations     Fall Risk:  Fall Risk Assessment was completed, and patient is at low risk for falls.   Predictive Model Details         5 (Low)  Factor Value    Calculated 9/10/2024 01:51 Age 44    Risk of Fall Model Active Peripheral IV Present     Respiratory Rate 18     Magnesium 1.9 mg/dL     Number of Distinct Medication Classes administered 4     Goldy Scale not on file     Clinically Relevant Sex Not Female     Chloride 101 mmol/L     ALT 52 U/L     Total Bilirubin 0.6 mg/dL     Days after Admission 0.239     Diastolic      Creatinine 1.11 mg/dL     Albumin 4.5 g/dL     Calcium 9.7 mg/dL     Potassium 3.6 mmol/L         Weight:       09/09/24 1958   Weight: 98.9 kg (218 lb 0.6 oz)       Intake and Output  No intake or output data in the 24 hours ending 09/10/24 0159    Diet:   Dietary Orders (From admission, onward)       Start     Ordered    09/10/24 0130  Diet: Regular/House; Fluid Consistency: Thin (IDDSI 0)  Diet Effective Now        References:    Diet Order Crosswalk   Question Answer Comment   Diets: Regular/House    Fluid Consistency: Thin (IDDSI 0)        09/10/24 0130                     Most recent vitals:   Vitals:    09/09/24 2316 09/10/24 0016 09/10/24 0101 09/10/24 0102   BP: (!) 212/123 (!) 214/125 (!) 191/113    BP Location:   Right arm    Patient Position:   Sitting    Pulse: 74 73 89    Resp:   18    Temp:       TempSrc:       SpO2: 97% 98% 97% 97%   Weight:       Height:           Active LDAs/IV Access:   Lines, Drains & Airways       Active LDAs       Name Placement date Placement time Site Days    Peripheral IV 09/09/24 2137 Left Antecubital 09/09/24 2137  Antecubital  less than 1                    Skin Condition:   Skin Assessments (last day)       None             Labs (abnormal labs have a star):   Labs Reviewed   COMPREHENSIVE METABOLIC PANEL - Abnormal; Notable for the following components:       Result Value    Glucose 101 (*)     ALT (SGPT) 52 (*)     AST (SGOT) 50 (*)     All other components within normal limits    Narrative:     GFR Normal >60  Chronic Kidney Disease <60  Kidney Failure <15     URINALYSIS W/  MICROSCOPIC IF INDICATED (NO CULTURE) - Abnormal; Notable for the following components:    Blood, UA Trace (*)     All other components within normal limits   CBC WITH AUTO DIFFERENTIAL - Abnormal; Notable for the following components:    WBC 10.81 (*)     RDW 12.1 (*)     RDW-SD 36.9 (*)     Neutrophils, Absolute 7.93 (*)     All other components within normal limits   RESPIRATORY PANEL PCR W/ COVID-19 (SARS-COV-2), NP SWAB IN UTM/VTP, 2 HR TAT - Normal    Narrative:     In the setting of a positive respiratory panel with a viral infection PLUS a negative procalcitonin without other underlying concern for bacterial infection, consider observing off antibiotics or discontinuation of antibiotics and continue supportive care. If the respiratory panel is positive for atypical bacterial infection (Bordetella pertussis, Chlamydophila pneumoniae, or Mycoplasma pneumoniae), consider antibiotic de-escalation to target atypical bacterial infection.   BNP (IN-HOUSE) - Normal    Narrative:     This assay is used as an aid in the diagnosis of individuals suspected of having heart failure. It can be used as an aid in the diagnosis of acute decompensated heart failure (ADHF) in patients presenting with signs and symptoms of ADHF to the emergency department (ED). In addition, NT-proBNP of <300 pg/mL indicates ADHF is not likely.    Age Range Result Interpretation  NT-proBNP Concentration (pg/mL:      <50             Positive            >450                   Gray                 300-450                    Negative             <300    50-75           Positive            >900                  Gray                300-900                  Negative            <300      >75             Positive            >1800                  Gray                300-1800                  Negative            <300   MAGNESIUM - Normal   TSH - Normal   TROPONIN - Normal    Narrative:     High Sensitive Troponin T Reference Range:  <14.0 ng/L- Negative  Female for AMI  <22.0 ng/L- Negative Male for AMI  >=14 - Abnormal Female indicating possible myocardial injury.  >=22 - Abnormal Male indicating possible myocardial injury.   Clinicians would have to utilize clinical acumen, EKG, Troponin, and serial changes to determine if it is an Acute Myocardial Infarction or myocardial injury due to an underlying chronic condition.        RAINBOW DRAW    Narrative:     The following orders were created for panel order [place] Draw.  Procedure                               Abnormality         Status                     ---------                               -----------         ------                     Green Top (Gel)[[id number]]                                  Final result               Lavender Top[[id number]]                                     Final result               Gold Top - SST[[id number]]                                   Final result               Light Blue Top[[id number]]                                   Final result                 Please view results for these tests on the individual orders.   URINALYSIS, MICROSCOPIC ONLY   HIGH SENSITIVITIY TROPONIN T 2HR   GREEN TOP   LAVENDER TOP   GOLD TOP - SST   LIGHT BLUE TOP   CBC AND DIFFERENTIAL    Narrative:     The following orders were created for panel order CBC & Differential.  Procedure                               Abnormality         Status                     ---------                               -----------         ------                     CBC Auto Differential[[id number]]        Abnormal            Final result                 Please view results for these tests on the individual orders.       LOC: Person, Place, Time, and Situation    Telemetry:  Telemetry    Cardiac Monitoring Ordered: yes    EKG:   ECG 12 Lead Other; HTN   Preliminary Result   HEART RATE=99  bpm   RR Bzkgmnms=201  ms   TX Lucjczda=429  ms   P Horizontal Axis=-16  deg   P Front Axis=49  deg   QRSD Olmlzatx=180  ms   QT Lumajqzm=257  ms    JJaI=095  ms   QRS Axis=68  deg   T Wave Axis=-22  deg   - NORMAL ECG -   Sinus rhythm   Date and Time of Study:2024-09-09 20:03:43      Telemetry Scan   Final Result      Telemetry Scan   Final Result          Medications Given in the ED:   Medications   sodium chloride 0.9 % flush 10 mL (has no administration in time range)   niCARdipine (CARDENE) 25mg in 250mL NS infusion (7.5 mg/hr Intravenous Rate/Dose Change 9/10/24 0105)   amoxicillin-clavulanate (AUGMENTIN) 875-125 MG per tablet 1 tablet (has no administration in time range)   sodium chloride 0.9 % flush 10 mL (has no administration in time range)   sodium chloride 0.9 % flush 10 mL (has no administration in time range)   sodium chloride 0.9 % infusion 40 mL (has no administration in time range)   aluminum-magnesium hydroxide-simethicone (MAALOX MAX) 400-400-40 MG/5ML suspension 15 mL (has no administration in time range)   ondansetron ODT (ZOFRAN-ODT) disintegrating tablet 4 mg (has no administration in time range)     Or   ondansetron (ZOFRAN) injection 4 mg (has no administration in time range)   Potassium Replacement - Follow Nurse / BPA Driven Protocol (has no administration in time range)   Magnesium Standard Dose Replacement - Follow Nurse / BPA Driven Protocol (has no administration in time range)   Phosphorus Replacement - Follow Nurse / BPA Driven Protocol (has no administration in time range)   Calcium Replacement - Follow Nurse / BPA Driven Protocol (has no administration in time range)   sennosides-docusate (PERICOLACE) 8.6-50 MG per tablet 2 tablet (has no administration in time range)     And   polyethylene glycol (MIRALAX) packet 17 g (has no administration in time range)     And   bisacodyl (DULCOLAX) EC tablet 5 mg (has no administration in time range)     And   bisacodyl (DULCOLAX) suppository 10 mg (has no administration in time range)   melatonin tablet 5 mg (has no administration in time range)   labetalol (NORMODYNE,TRANDATE)  injection 10 mg (10 mg Intravenous Given 9/9/24 213)   labetalol (NORMODYNE,TRANDATE) injection 10 mg (10 mg Intravenous Given 9/9/24 9071)       Imaging results:  XR Chest 2 View    Result Date: 9/9/2024  Impression: No acute cardiopulmonary process. Electronically Signed: Lev Zaldivar MD  9/9/2024 6:53 PM EDT  Workstation ID: [id number]     Social issues:   Social History     Socioeconomic History    Marital status:    Tobacco Use    Smoking status: Never     Passive exposure: Never    Smokeless tobacco: Never   Vaping Use    Vaping status: Never Used   Substance and Sexual Activity    Alcohol use: Never    Drug use: Never    Sexual activity: Defer       NIH Stroke Scale:  Interval: (not recorded)  1a. Level of Consciousness: (not recorded)  1b. LOC Questions: (not recorded)  1c. LOC Commands: (not recorded)  2. Best Gaze: (not recorded)  3. Visual: (not recorded)  4. Facial Palsy: (not recorded)  5a. Motor Arm, Left: (not recorded)  5b. Motor Arm, Right: (not recorded)  6a. Motor Leg, Left: (not recorded)  6b. Motor Leg, Right: (not recorded)  7. Limb Ataxia: (not recorded)  8. Sensory: (not recorded)  9. Best Language: (not recorded)  10. Dysarthria: (not recorded)  11. Extinction and Inattention (formerly Neglect): (not recorded)    Total (NIH Stroke Scale): (not recorded)     Additional notable assessment information:       Nursing report ED to floor:    ICU nurse, Ivis SABILLON.     Derrick Clinton LPN   09/10/24 01:59 EDT

## 2024-09-10 NOTE — ED PROVIDER NOTES
Subjective   History of Present Illness  44-year-old male presents the ED with complaints of having a high blood pressure reading today and was sent from urgent care due to this.  He reports that he has not followed up with primary care or present to an urgent care in over 5 years, unsure how long his blood pressure has been high.  Reports that he went to urgent care today due to congestion and a mild productive cough that is been ongoing for approximately 1 1/2 to 2 weeks, was started on Augmentin for this and instructed to come here.  Denies headache, changes in vision, numbness, tingling, chest pain, shortness of breath, abdominal pain, nausea, vomiting, fever        Review of Systems   Constitutional:  Negative for fever.   HENT:  Positive for congestion and sinus pressure.    Respiratory:  Positive for cough. Negative for chest tightness and shortness of breath.    Cardiovascular:  Negative for chest pain.   Gastrointestinal:  Negative for abdominal pain, nausea and vomiting.   Neurological:  Negative for dizziness and headaches.       No past medical history on file.    Allergies   Allergen Reactions    Codeine Nausea And Vomiting       Past Surgical History:   Procedure Laterality Date    WISDOM TOOTH EXTRACTION         No family history on file.    Social History     Socioeconomic History    Marital status:    Tobacco Use    Smoking status: Never     Passive exposure: Never    Smokeless tobacco: Never   Vaping Use    Vaping status: Never Used   Substance and Sexual Activity    Alcohol use: Never    Drug use: Never    Sexual activity: Defer           Objective   Physical Exam  Vitals reviewed.   HENT:      Head: Normocephalic and atraumatic.      Nose: Congestion present.   Eyes:      Extraocular Movements: Extraocular movements intact.      Conjunctiva/sclera: Conjunctivae normal.   Cardiovascular:      Rate and Rhythm: Normal rate and regular rhythm.      Pulses: Normal pulses.      Heart sounds:  "Normal heart sounds.   Pulmonary:      Effort: Pulmonary effort is normal.      Breath sounds: Normal breath sounds. No wheezing.   Abdominal:      General: Bowel sounds are normal.      Palpations: Abdomen is soft.      Tenderness: There is no abdominal tenderness.   Musculoskeletal:         General: Normal range of motion.   Skin:     General: Skin is warm and dry.   Neurological:      General: No focal deficit present.      Mental Status: He is alert and oriented to person, place, and time.   Psychiatric:         Mood and Affect: Mood normal.         Behavior: Behavior normal.         Procedures    EKG independently interpreted by Dr. Skinner and reviewed by myself as sinus rhythm at a rate of 99 with no previous to compare to           ED Course  ED Course as of 09/10/24 0125   Mon Sep 09, 2024   2240 BP(!): 194/117  Ordered another dose of labetolol, will start cardene if no improvement [KB]   Tue Sep 10, 2024   0001 Of note, patient had a chest x-ray today prior to arrival with the impression showing no acute cardiopulmonary process [KB]   0015 Spoke to Leilain SARAVIA who agrees to admit patient [KB]      ED Course User Index  [KB] Melissa Villagomez APRN      BP (!) 191/113 (BP Location: Right arm, Patient Position: Sitting)   Pulse 89   Temp 98.3 °F (36.8 °C) (Oral)   Resp 18   Ht 167.6 cm (66\")   Wt 98.9 kg (218 lb 0.6 oz)   SpO2 97%   BMI 35.19 kg/m²   Labs Reviewed   COMPREHENSIVE METABOLIC PANEL - Abnormal; Notable for the following components:       Result Value    Glucose 101 (*)     ALT (SGPT) 52 (*)     AST (SGOT) 50 (*)     All other components within normal limits    Narrative:     GFR Normal >60  Chronic Kidney Disease <60  Kidney Failure <15     URINALYSIS W/ MICROSCOPIC IF INDICATED (NO CULTURE) - Abnormal; Notable for the following components:    Blood, UA Trace (*)     All other components within normal limits   CBC WITH AUTO DIFFERENTIAL - Abnormal; Notable for the following components:    " WBC 10.81 (*)     RDW 12.1 (*)     RDW-SD 36.9 (*)     Neutrophils, Absolute 7.93 (*)     All other components within normal limits   RESPIRATORY PANEL PCR W/ COVID-19 (SARS-COV-2), NP SWAB IN UTM/VTP, 2 HR TAT - Normal    Narrative:     In the setting of a positive respiratory panel with a viral infection PLUS a negative procalcitonin without other underlying concern for bacterial infection, consider observing off antibiotics or discontinuation of antibiotics and continue supportive care. If the respiratory panel is positive for atypical bacterial infection (Bordetella pertussis, Chlamydophila pneumoniae, or Mycoplasma pneumoniae), consider antibiotic de-escalation to target atypical bacterial infection.   BNP (IN-HOUSE) - Normal    Narrative:     This assay is used as an aid in the diagnosis of individuals suspected of having heart failure. It can be used as an aid in the diagnosis of acute decompensated heart failure (ADHF) in patients presenting with signs and symptoms of ADHF to the emergency department (ED). In addition, NT-proBNP of <300 pg/mL indicates ADHF is not likely.    Age Range Result Interpretation  NT-proBNP Concentration (pg/mL:      <50             Positive            >450                   Gray                 300-450                    Negative             <300    50-75           Positive            >900                  Gray                300-900                  Negative            <300      >75             Positive            >1800                  Gray                300-1800                  Negative            <300   MAGNESIUM - Normal   TSH - Normal   RAINBOW DRAW    Narrative:     The following orders were created for panel order [place] Draw.  Procedure                               Abnormality         Status                     ---------                               -----------         ------                     Green Top (Gel)[[id number]]                                  Final result                Lavender Top[[id number]]                                     Final result               Gold Top - SST[[id number]]                                   Final result               Light Blue Top[[id number]]                                   Final result                 Please view results for these tests on the individual orders.   URINALYSIS, MICROSCOPIC ONLY   GREEN TOP   LAVENDER TOP   GOLD TOP - SST   LIGHT BLUE TOP   CBC AND DIFFERENTIAL    Narrative:     The following orders were created for panel order CBC & Differential.  Procedure                               Abnormality         Status                     ---------                               -----------         ------                     CBC Auto Differential[[id number]]        Abnormal            Final result                 Please view results for these tests on the individual orders.     Medications   sodium chloride 0.9 % flush 10 mL (has no administration in time range)   niCARdipine (CARDENE) 25mg in 250mL NS infusion (7.5 mg/hr Intravenous Rate/Dose Change 9/10/24 0105)   labetalol (NORMODYNE,TRANDATE) injection 10 mg (10 mg Intravenous Given 9/9/24 2137)   labetalol (NORMODYNE,TRANDATE) injection 10 mg (10 mg Intravenous Given 9/9/24 2253)     XR Chest 2 View    Result Date: 9/9/2024  Impression: No acute cardiopulmonary process. Electronically Signed: Lev Zaldivar MD  9/9/2024 6:53 PM EDT  Workstation ID: [id number]                                          Medical Decision Making  Patient was seen for the above complaints.  He reports that he takes no daily medications.  He was asymptomatic with the hypertension during ER course.  He was afebrile and nontoxic-appearing on assessment.  IV was established and blood work was obtained to assess for infection, electrolyte abnormalities, kidney injury, thyroid abnormality, BNP level.  Electrolytes fairly within normal limits, mildly elevated liver enzymes with no previous to compare to kidney  function within normal limits, BNP 92.3, mag 1.9, TSH 1.7, white blood cell count 10.81, hemoglobin 16.9.  Respiratory panel negative.  UA was obtained to assess for dehydration, this shows no bacteria or red blood cells.  Considered chest x-ray however patient had 1 today at urgent care prior to arrival that showed no acute cardiopulmonary process.  Was initially given 10 mg IV labetalol without significant improvement, was then given another 10 mg IV labetalol without significant improvement.  Was started on Cardene drip for the hypertensive urgency.  Will be admitted to the hospitalist group for further management.  Discussed with Leilani SARAVIA with hospitalist service who agrees to admit patient.  Discussed plan of care and disposition with patient and wife at bedside who verbalized understanding were agreeable with disposition.    Based on the clinical findings at this time I anticipate the patient will require a 2 midnight stay    Problems Addressed:  Hypertensive urgency: acute illness or injury  Pansinusitis, unspecified chronicity: acute illness or injury    Amount and/or Complexity of Data Reviewed  Labs: ordered. Decision-making details documented in ED Course.  Radiology: ordered and independent interpretation performed. Decision-making details documented in ED Course.    Risk  Prescription drug management.  Decision regarding hospitalization.        Final diagnoses:   Hypertensive urgency   Pansinusitis, unspecified chronicity       ED Disposition  ED Disposition       ED Disposition   Decision to Admit    Condition   --    Comment   Level of Care: Telemetry [5]   Admitting Physician: IKE QUICK [[id number]]   Attending Physician: IKE QUICK [[id number]]                 No follow-up provider specified.       Medication List      No changes were made to your prescriptions during this visit.            Melissa Villagomez, RANI  09/10/24 0126

## 2024-09-10 NOTE — CASE MANAGEMENT/SOCIAL WORK
Discharge Planning Assessment   Matt     Patient Name: Smith Fletcher  MRN: [id number]  Today's Date: 9/10/2024    Admit Date: 9/9/2024    Plan: From Home with family.   Discharge Needs Assessment       Row Name 09/10/24 1045       Living Environment    People in Home child(mary), dependent;spouse    Name(s) of People in Home Spouse Noelle    Current Living Arrangements home    Potentially Unsafe Housing Conditions none    In the past 12 months has the electric, gas, oil, or water company threatened to shut off services in your home? No    Primary Care Provided by self    Provides Primary Care For no one    Family Caregiver if Needed spouse    Family Caregiver Names Spouse Noelle    Quality of Family Relationships helpful;involved;supportive    Able to Return to Prior Arrangements yes       Resource/Environmental Concerns    Resource/Environmental Concerns none    Transportation Concerns none       Transportation Needs    In the past 12 months, has lack of transportation kept you from medical appointments or from getting medications? no    In the past 12 months, has lack of transportation kept you from meetings, work, or from getting things needed for daily living? No       Food Insecurity    Within the past 12 months, you worried that your food would run out before you got the money to buy more. Never true    Within the past 12 months, the food you bought just didn't last and you didn't have money to get more. Never true       Transition Planning    Patient/Family Anticipates Transition to home with family    Patient/Family Anticipated Services at Transition none    Transportation Anticipated car, drives self;family or friend will provide       Discharge Needs Assessment    Readmission Within the Last 30 Days no previous admission in last 30 days    Equipment Currently Used at Home none    Concerns to be Addressed no discharge needs identified;denies needs/concerns at this time    Anticipated Changes Related to  Illness none    Equipment Needed After Discharge none    Provided Post Acute Provider List? N/A    Provided Post Acute Provider Quality & Resource List? N/A                   Discharge Plan       Row Name 09/10/24 1046       Plan    Plan From Home with family.    Patient/Family in Agreement with Plan yes    Provided Post Acute Provider List? N/A    Provided Post Acute Provider Quality & Resource List? N/A    Plan Comments CM met with patient at the bedside to discuss discharge planning. Confirmed insurance and pharmacy. Patient does not have PCP - patient agreeable to CM providing [facility] info to obtain new PCP. Patient accepts M2B. Patient denies any difficulty affording food, utilities, or medications. Patient is not current with any HHC/OPPT/OT services. Patient lives at home with spouse, Noelle, and kids, is IADLs. Noelle to transport patient at d/c. DC Barriers: 2L NC, Cardene gtt, elevated WBC                  Continued Care and Services - Admitted Since 9/9/2024    No active coordination exists for this encounter.       Expected Discharge Date and Time       Expected Discharge Date Expected Discharge Time    Sep 12, 2024            Demographic Summary       Row Name 09/10/24 1045       General Information    Admission Type observation    Arrived From emergency department    Required Notices Provided Observation Status Notice    Referral Source admission list    Reason for Consult discharge planning    Preferred Language English       Contact Information    Permission Granted to Share Info With     Contact Information Obtained for                    Functional Status       Row Name 09/10/24 1045       Functional Status    Usual Activity Tolerance good    Current Activity Tolerance good       Functional Status, IADL    Medications independent    Meal Preparation independent    Housekeeping independent    Laundry independent    Shopping independent       Mental Status    General  Appearance WDL WDL       Mental Status Summary    Recent Changes in Mental Status/Cognitive Functioning no changes             Eloise Lau RN     [phone]  [email or web address]

## 2024-09-11 VITALS
WEIGHT: 210.76 LBS | HEART RATE: 69 BPM | RESPIRATION RATE: 17 BRPM | SYSTOLIC BLOOD PRESSURE: 154 MMHG | OXYGEN SATURATION: 92 % | BODY MASS INDEX: 33.87 KG/M2 | TEMPERATURE: 98 F | DIASTOLIC BLOOD PRESSURE: 100 MMHG | HEIGHT: 66 IN

## 2024-09-11 LAB
BASOPHILS # BLD AUTO: 0.02 10*3/MM3 (ref 0–0.2)
BASOPHILS NFR BLD AUTO: 0.2 % (ref 0–1.5)
DEPRECATED RDW RBC AUTO: 39.3 FL (ref 37–54)
EOSINOPHIL # BLD AUTO: 0.15 10*3/MM3 (ref 0–0.4)
EOSINOPHIL NFR BLD AUTO: 1.5 % (ref 0.3–6.2)
ERYTHROCYTE [DISTWIDTH] IN BLOOD BY AUTOMATED COUNT: 12.6 % (ref 12.3–15.4)
HCT VFR BLD AUTO: 53.3 % (ref 37.5–51)
HGB BLD-MCNC: 17.6 G/DL (ref 13–17.7)
IMM GRANULOCYTES # BLD AUTO: 0.04 10*3/MM3 (ref 0–0.05)
IMM GRANULOCYTES NFR BLD AUTO: 0.4 % (ref 0–0.5)
LYMPHOCYTES # BLD AUTO: 2.47 10*3/MM3 (ref 0.7–3.1)
LYMPHOCYTES NFR BLD AUTO: 24.6 % (ref 19.6–45.3)
MCH RBC QN AUTO: 28.8 PG (ref 26.6–33)
MCHC RBC AUTO-ENTMCNC: 33 G/DL (ref 31.5–35.7)
MCV RBC AUTO: 87.1 FL (ref 79–97)
MONOCYTES # BLD AUTO: 0.72 10*3/MM3 (ref 0.1–0.9)
MONOCYTES NFR BLD AUTO: 7.2 % (ref 5–12)
NEUTROPHILS NFR BLD AUTO: 6.63 10*3/MM3 (ref 1.7–7)
NEUTROPHILS NFR BLD AUTO: 66.1 % (ref 42.7–76)
NRBC BLD AUTO-RTO: 0 /100 WBC (ref 0–0.2)
PLATELET # BLD AUTO: 275 10*3/MM3 (ref 140–450)
PMV BLD AUTO: 9.1 FL (ref 6–12)
RBC # BLD AUTO: 6.12 10*6/MM3 (ref 4.14–5.8)
WBC NRBC COR # BLD AUTO: 10.03 10*3/MM3 (ref 3.4–10.8)

## 2024-09-11 PROCEDURE — 25010000002 NICARDIPINE 2.5 MG/ML SOLUTION 10 ML VIAL

## 2024-09-11 PROCEDURE — 25810000003 SODIUM CHLORIDE 0.9 % SOLUTION 250 ML FLEX CONT

## 2024-09-11 PROCEDURE — G0378 HOSPITAL OBSERVATION PER HR: HCPCS

## 2024-09-11 PROCEDURE — 96366 THER/PROPH/DIAG IV INF ADDON: CPT

## 2024-09-11 PROCEDURE — 85025 COMPLETE CBC W/AUTO DIFF WBC: CPT | Performed by: NURSE PRACTITIONER

## 2024-09-11 RX ORDER — METOPROLOL SUCCINATE 25 MG/1
25 TABLET, EXTENDED RELEASE ORAL
Qty: 90 TABLET | Refills: 0 | Status: SHIPPED | OUTPATIENT
Start: 2024-09-12 | End: 2024-09-18 | Stop reason: SDUPTHER

## 2024-09-11 RX ORDER — LOSARTAN POTASSIUM 50 MG/1
50 TABLET ORAL
Qty: 90 TABLET | Refills: 0 | Status: SHIPPED | OUTPATIENT
Start: 2024-09-12

## 2024-09-11 RX ORDER — AMLODIPINE BESYLATE 5 MG/1
5 TABLET ORAL
Qty: 90 TABLET | Refills: 0 | Status: SHIPPED | OUTPATIENT
Start: 2024-09-12 | End: 2024-09-16 | Stop reason: SDUPTHER

## 2024-09-11 RX ADMIN — LOSARTAN POTASSIUM 50 MG: 50 TABLET, FILM COATED ORAL at 08:08

## 2024-09-11 RX ADMIN — OXYMETAZOLINE HYDROCHLORIDE 2 SPRAY: 0.5 SPRAY NASAL at 08:09

## 2024-09-11 RX ADMIN — AMLODIPINE BESYLATE 5 MG: 5 TABLET ORAL at 08:08

## 2024-09-11 RX ADMIN — METOPROLOL SUCCINATE 50 MG: 50 TABLET, EXTENDED RELEASE ORAL at 08:08

## 2024-09-11 RX ADMIN — NICARDIPINE HYDROCHLORIDE 2.5 MG/HR: 25 INJECTION, SOLUTION INTRAVENOUS at 01:29

## 2024-09-11 RX ADMIN — Medication 10 ML: at 08:10

## 2024-09-11 RX ADMIN — ACETAMINOPHEN 650 MG: 325 TABLET, FILM COATED ORAL at 06:31

## 2024-09-11 RX ADMIN — AMOXICILLIN AND CLAVULANATE POTASSIUM 1 TABLET: 875; 125 TABLET, FILM COATED ORAL at 08:08

## 2024-09-11 NOTE — PLAN OF CARE
Goal Outcome Evaluation:      Pt A&Ox4. Adequate urine output, no BM this shift. Cardene gtt infusing. Vital signs stable, pt has no complaints at this time.

## 2024-09-11 NOTE — CASE MANAGEMENT/SOCIAL WORK
Case Management Discharge Note      Final Note: Home    Provided Post Acute Provider List?: N/A  Provided Post Acute Provider Quality & Resource List?: N/A    Selected Continued Care - Discharged on 9/11/2024 Admission date: 9/9/2024 - Discharge disposition: Home or Self Care       Transportation Services  Private: Car    Final Discharge Disposition Code: 01 - home or self-care      BRITT Wyatt RN  ICU/OPCV   O: [phone]  C: [phone]  [email or web address]

## 2024-09-11 NOTE — DISCHARGE SUMMARY
[facility] Medicine Services  Discharge Summary    Date of Service: 2024  Patient Name: Smith Fletcher  : 1980  MRN: [id number]    Date of Admission: 2024  Discharge Diagnosis:   Hypertensive urgency  Elevated transaminases  Acute sinusitis  Obesity    Date of Discharge: 2024  Primary Care Physician: Provider, No Known      Presenting Problem:   Hypertensive urgency [I16.0]  Pansinusitis, unspecified chronicity [J32.4]    Active and Resolved Hospital Problems:  Active Hospital Problems    Diagnosis POA    **Hypertensive urgency [I16.0] Yes      Resolved Hospital Problems   No resolved problems to display.         Hospital Course     HPI:    Patient is a 44-year-old male who presented to the hospital with complaints of headache and elevated blood pressure.  Please see H&P for details.    Hospital Course:  The patient presented with uncontrolled hypertension with systolic blood pressure over 250.  He was started on a Cardene drip in the ER and admitted to the PCU. The patient did not have a previous diagnosis of hypertension and therefore was not on any medications for this.  Although he never saw PCP prior to admission and therefore likely did not get diagnosed.  Regardless, I initiated him on oral antihypertensive medications and Cardene drip was weaned off the day of discharge.  His BP was stable on oral antihypertensive medications.  He will need to follow-up with his PCP in 1 to 2 weeks.  I advised him to get a blood pressure cuff and check his blood pressure every morning, document these readings and take them to his PCPs office on follow-up.  He is stable for discharge.        DISCHARGE Follow Up Recommendations for labs and diagnostics: Follow-up with your PCP in 1 to 2 weeks.        Day of Discharge     Vital Signs:  Temp:  [97.6 °F (36.4 °C)-98.1 °F (36.7 °C)] 98 °F (36.7 °C)  Heart Rate:  [52-83] 58  Resp:  [16-27] 17  BP: (103-157)/() 149/101  Flow (L/min):   [0-2] 0    Physical Exam:  Physical Exam   General Appearance:  Alert, cooperative, no distress, appears stated age  Head:  Normocephalic, without obvious abnormality, atraumatic  Eyes:  PERRL, conjunctiva/corneas clear, EOM's intact, fundi benign, both eyes  Ears:  Normal TM's and external ear canals, both ears  Nose: Nares normal, septum midline, mucosa normal, no drainage or sinus tenderness  Throat: Lips, mucosa, and tongue normal; teeth and gums normal  Neck: Supple, symmetrical, trachea midline, no adenopathy, thyroid: not enlarged, symmetric, no tenderness/mass/nodules, no carotid bruit or JVD  Lungs:   Clear to auscultation bilaterally, respirations unlabored  Heart:  Regular rate and rhythm, S1, S2 normal, no murmur, rub or gallop  Abdomen:  Soft, non-tender, bowel sounds active all four quadrants,  no masses, no organomegaly  Extremities: Extremities normal, atraumatic, no cyanosis or edema  Pulses: 2+ and symmetric  Skin: Skin color, texture, turgor normal, no rashes or lesions  Neurologic: Normal        Pertinent  and/or Most Recent Results     LAB RESULTS:      Lab 09/11/24  0616 09/09/24 2032   WBC 10.03 10.81*   HEMOGLOBIN 17.6 16.9   HEMATOCRIT 53.3* 48.4   PLATELETS 275 287   NEUTROS ABS 6.63 7.93*   IMMATURE GRANS (ABS) 0.04 0.03   LYMPHS ABS 2.47 2.15   MONOS ABS 0.72 0.58   EOS ABS 0.15 0.09   MCV 87.1 84.8         Lab 09/10/24  1306 09/09/24 2032   SODIUM  --  139   POTASSIUM 3.7 3.6   CHLORIDE  --  101   CO2  --  24.1   ANION GAP  --  13.9   BUN  --  11   CREATININE  --  1.11   EGFR  --  84.5   GLUCOSE  --  101*   CALCIUM  --  9.7   MAGNESIUM  --  1.9   TSH  --  1.700         Lab 09/09/24 2032   TOTAL PROTEIN 7.9   ALBUMIN 4.5   GLOBULIN 3.4   ALT (SGPT) 52*   AST (SGOT) 50*   BILIRUBIN 0.6   ALK PHOS 86         Lab 09/09/24 2032   PROBNP 92.3   HSTROP T 13                 Brief Urine Lab Results  (Last result in the past 365 days)        Color   Clarity   Blood   Leuk Est   Nitrite    Protein   CREAT   Urine HCG        09/09/24 2139 Yellow   Clear   Trace   Negative   Negative   Negative                 Microbiology Results (last 10 days)       Procedure Component Value - Date/Time    Respiratory Panel PCR w/COVID-19(SARS-CoV-2) ABIGAIL/PEYTON/AYLEEN/PAD/COR/JOSEF In-House, NP Swab in UTM/VTM, 2 HR TAT - Swab, Nasopharynx [[id number]]  (Normal) Collected: 09/09/24 2140    Lab Status: Final result Specimen: Swab from Nasopharynx Updated: 09/09/24 2234     ADENOVIRUS, PCR Not Detected     Coronavirus 229E Not Detected     Coronavirus HKU1 Not Detected     Coronavirus NL63 Not Detected     Coronavirus OC43 Not Detected     COVID19 Not Detected     Human Metapneumovirus Not Detected     Human Rhinovirus/Enterovirus Not Detected     Influenza A PCR Not Detected     Influenza B PCR Not Detected     Parainfluenza Virus 1 Not Detected     Parainfluenza Virus 2 Not Detected     Parainfluenza Virus 3 Not Detected     Parainfluenza Virus 4 Not Detected     RSV, PCR Not Detected     Bordetella pertussis pcr Not Detected     Bordetella parapertussis PCR Not Detected     Chlamydophila pneumoniae PCR Not Detected     Mycoplasma pneumo by PCR Not Detected    Narrative:      In the setting of a positive respiratory panel with a viral infection PLUS a negative procalcitonin without other underlying concern for bacterial infection, consider observing off antibiotics or discontinuation of antibiotics and continue supportive care. If the respiratory panel is positive for atypical bacterial infection (Bordetella pertussis, Chlamydophila pneumoniae, or Mycoplasma pneumoniae), consider antibiotic de-escalation to target atypical bacterial infection.            XR Chest 2 View    Result Date: 9/9/2024  Impression: Impression: No acute cardiopulmonary process. Electronically Signed: Lev Zaldivar MD  9/9/2024 6:53 PM EDT  Workstation ID: [id number]             Results for orders placed during the hospital encounter of  09/09/24    Adult Transthoracic Echo Complete W/ Cont if Necessary Per Protocol    Interpretation Summary    Left ventricular ejection fraction appears to be 61 - 65%.    Left ventricular diastolic function is consistent with (grade I) impaired relaxation.    The right ventricular cavity is borderline dilated.    Conclusion      Normal LV size and contractility EF of 60 to 65%  Abnormal relaxation pattern consistent with grade 1 diastolic dysfunction seen.  Borderline RV enlargement seen  Normal atrial size  Pulmonic valve is not well visualized.  Aortic valve, mitral valve, tricuspid valve appears structurally normal, trace to mild mitral regurgitation seen.  No pericardial effusion seen.  Proximal aorta appears normal in size.      Labs Pending at Discharge:      Procedures Performed           Consults:   Consults       Date and Time Order Name Status Description    9/10/2024 12:02 AM Hospitalist (on-call MD unless specified)                Discharge Details        Discharge Medications        New Medications        Instructions Start Date   amLODIPine 5 MG tablet  Commonly known as: NORVASC   5 mg, Oral, Every 24 Hours Scheduled   Start Date: September 12, 2024     losartan 50 MG tablet  Commonly known as: COZAAR   50 mg, Oral, Every 24 Hours Scheduled   Start Date: September 12, 2024     metoprolol succinate XL 25 MG 24 hr tablet  Commonly known as: TOPROL-XL   25 mg, Oral, Every 24 Hours Scheduled   Start Date: September 12, 2024            Continue These Medications        Instructions Start Date   amoxicillin-clavulanate 875-125 MG per tablet  Commonly known as: AUGMENTIN   1 tablet, Oral, 2 Times Daily               Allergies   Allergen Reactions    Codeine Nausea And Vomiting         Discharge Disposition:     Home or Self Care    Diet:  Hospital:  Diet Order   Procedures    Diet: Regular/House; Fluid Consistency: Thin (IDDSI 0)         Discharge Activity:         CODE STATUS:  Code Status and Medical  Interventions: CPR (Attempt to Resuscitate); Full Support   Ordered at: 09/10/24 0130     Level Of Support Discussed With:    Patient     Code Status (Patient has no pulse and is not breathing):    CPR (Attempt to Resuscitate)     Medical Interventions (Patient has pulse or is breathing):    Full Support         No future appointments.    Additional Instructions for the Follow-ups that You Need to Schedule       Discharge Follow-up with PCP   As directed       Currently Documented PCP:    Provider, No Known    PCP Phone Number:    None     Follow Up Details: Follow-up with your PCP in 1 to 2 weeks as scheduled.                Time spent on Discharge including face to face service: Greater than 30 minutes    Signature: Electronically signed by Jun Hardy MD, 09/11/24, 13:30 EDT.  [facility] Hospitalist Team

## 2024-09-11 NOTE — CASE MANAGEMENT/SOCIAL WORK
Continued Stay Note   Matt     Patient Name: Smith Fletcher  MRN: [id number]  Today's Date: 9/11/2024    Admit Date: 9/9/2024    Plan: Plan to dc home with family.   Discharge Plan       Row Name 09/11/24 0741       Plan    Plan Plan to dc home with family.    Plan Comments DC Barriers:  Valentin gtt, monitor new b/p meds, 2L NC.                 Expected Discharge Date and Time       Expected Discharge Date Expected Discharge Time    Sep 12, 2024               BRITT Wyatt RN  ICU/OPCV   O: [phone]  C: [phone]  [email or web address]

## 2024-09-12 ENCOUNTER — READMISSION MANAGEMENT (OUTPATIENT)
Dept: CALL CENTER | Facility: HOSPITAL | Age: 44
End: 2024-09-12
Payer: COMMERCIAL

## 2024-09-13 ENCOUNTER — TRANSITIONAL CARE MANAGEMENT TELEPHONE ENCOUNTER (OUTPATIENT)
Dept: CALL CENTER | Facility: HOSPITAL | Age: 44
End: 2024-09-13
Payer: COMMERCIAL

## 2024-09-13 ENCOUNTER — TELEPHONE (OUTPATIENT)
Dept: FAMILY MEDICINE CLINIC | Facility: CLINIC | Age: 44
End: 2024-09-13
Payer: COMMERCIAL

## 2024-09-13 NOTE — OUTREACH NOTE
Call Center TCM Note      Flowsheet Row Responses   [facility] facility patient discharged from? [facility]   Does the patient have one of the following disease processes/diagnoses(primary or secondary)? Other   TCM attempt successful? No   Unsuccessful attempts Attempt 1   Call Status Left message            Alma Dickson RN    9/13/2024, 11:33 EDT

## 2024-09-13 NOTE — OUTREACH NOTE
Prep Survey      Flowsheet Row Responses   [facility] [place] by [facility] patient discharged from? [facility]   Is LACE score < 7 ? Yes   Eligibility [facility]   Date of Admission 09/09/24   Date of Discharge 09/11/24   Discharge Disposition Home or Self Care   Discharge diagnosis Hypertensive urgency   Does the patient have one of the following disease processes/diagnoses(primary or secondary)? Other   Does the patient have Home health ordered? No   Is there a DME ordered? No   Comments regarding appointments new PCP appt   Prep survey completed? Yes            Wandy HELTON - Registered Nurse

## 2024-09-13 NOTE — OUTREACH NOTE
Call Center TCM Note      Flowsheet Row Responses   [facility] patient discharged from? [facility]   Does the patient have one of the following disease processes/diagnoses(primary or secondary)? Other   TCM attempt successful? Yes   Call start time 1548   Call end time 1555   Meds reviewed with patient/caregiver? Yes   Is the patient having any side effects they believe may be caused by any medication additions or changes? No   Does the patient have all medications ordered at discharge? Yes   Is the patient taking all medications as directed (includes completed medication regime)? Yes   Comments New patient appt with PCP on 09/16/24, within TCM time frame.   Does the patient have an appointment with their PCP within 7-14 days of discharge? Yes   Has home health visited the patient within 72 hours of discharge? N/A   Psychosocial issues? No   Did the patient receive a copy of their discharge instructions? Yes   Nursing interventions Reviewed instructions with patient   What is the patient's perception of their health status since discharge? Improving   Is the patient/caregiver able to teach back signs and symptoms related to disease process for when to call PCP? Yes   Is the patient/caregiver able to teach back signs and symptoms related to disease process for when to call 911? Yes   Is the patient/caregiver able to teach back the hierarchy of who to call/visit for symptoms/problems? PCP, Specialist, Home health nurse, Urgent Care, ED, 911 Yes   If the patient is a current smoker, are they able to teach back resources for cessation? Not a smoker   Additional teach back comments States is monitoring BP at home and keeping record for appts.   TCM call completed? Yes   Wrap up additional comments Patient states is improving, and monitoring BP at home/keeping record for appts. Reports BP slowly decreasing-150/100 this morning. States will recheck this afternoon. States continues with antibiotic for sinus/ear/URI. States  will call nurse call center if has any further elevated BP or any questions/concerns. Denies any needs at this time. TCM complete.   Call end time 1555   Would this patient benefit from a Referral to [facility] Social Work? No   Is the patient interested in additional calls from an ambulatory ? No            Alma Dickson RN    9/13/2024, 15:55 EDT

## 2024-09-13 NOTE — TELEPHONE ENCOUNTER
Attempted to call pt to confirm 9/16 appt, no answer: no verbal on file, left msg for pt to call office

## 2024-09-16 ENCOUNTER — OFFICE VISIT (OUTPATIENT)
Dept: FAMILY MEDICINE CLINIC | Facility: CLINIC | Age: 44
End: 2024-09-16
Payer: COMMERCIAL

## 2024-09-16 VITALS
DIASTOLIC BLOOD PRESSURE: 98 MMHG | BODY MASS INDEX: 33.59 KG/M2 | WEIGHT: 209 LBS | OXYGEN SATURATION: 99 % | HEIGHT: 66 IN | SYSTOLIC BLOOD PRESSURE: 168 MMHG | HEART RATE: 90 BPM

## 2024-09-16 DIAGNOSIS — J06.9 UPPER RESPIRATORY TRACT INFECTION, UNSPECIFIED TYPE: ICD-10-CM

## 2024-09-16 DIAGNOSIS — Z00.00 PREVENTATIVE HEALTH CARE: ICD-10-CM

## 2024-09-16 DIAGNOSIS — I16.0 HYPERTENSIVE URGENCY: Primary | ICD-10-CM

## 2024-09-16 LAB
DEPRECATED RDW RBC AUTO: 39.1 FL (ref 37–54)
ERYTHROCYTE [DISTWIDTH] IN BLOOD BY AUTOMATED COUNT: 12.6 % (ref 12.3–15.4)
HCT VFR BLD AUTO: 52.1 % (ref 37.5–51)
HGB BLD-MCNC: 18 G/DL (ref 13–17.7)
MCH RBC QN AUTO: 29.8 PG (ref 26.6–33)
MCHC RBC AUTO-ENTMCNC: 34.5 G/DL (ref 31.5–35.7)
MCV RBC AUTO: 86.1 FL (ref 79–97)
PLATELET # BLD AUTO: 356 10*3/MM3 (ref 140–450)
PMV BLD AUTO: 10.1 FL (ref 6–12)
RBC # BLD AUTO: 6.05 10*6/MM3 (ref 4.14–5.8)
WBC NRBC COR # BLD AUTO: 8.62 10*3/MM3 (ref 3.4–10.8)

## 2024-09-16 PROCEDURE — 86803 HEPATITIS C AB TEST: CPT | Performed by: NURSE PRACTITIONER

## 2024-09-16 PROCEDURE — 80061 LIPID PANEL: CPT | Performed by: NURSE PRACTITIONER

## 2024-09-16 PROCEDURE — 36415 COLL VENOUS BLD VENIPUNCTURE: CPT | Performed by: NURSE PRACTITIONER

## 2024-09-16 PROCEDURE — 82728 ASSAY OF FERRITIN: CPT | Performed by: NURSE PRACTITIONER

## 2024-09-16 PROCEDURE — 83036 HEMOGLOBIN GLYCOSYLATED A1C: CPT | Performed by: NURSE PRACTITIONER

## 2024-09-16 PROCEDURE — 99495 TRANSJ CARE MGMT MOD F2F 14D: CPT | Performed by: NURSE PRACTITIONER

## 2024-09-16 PROCEDURE — 80050 GENERAL HEALTH PANEL: CPT | Performed by: NURSE PRACTITIONER

## 2024-09-16 RX ORDER — CEFDINIR 300 MG/1
300 CAPSULE ORAL 2 TIMES DAILY
Qty: 14 CAPSULE | Refills: 0 | Status: SHIPPED | OUTPATIENT
Start: 2024-09-16

## 2024-09-16 RX ORDER — ALBUTEROL SULFATE 90 UG/1
2 AEROSOL, METERED RESPIRATORY (INHALATION) EVERY 4 HOURS PRN
Qty: 8 G | Refills: 1 | Status: SHIPPED | OUTPATIENT
Start: 2024-09-16

## 2024-09-16 RX ORDER — METHYLPREDNISOLONE 4 MG
TABLET, DOSE PACK ORAL
Qty: 21 TABLET | Refills: 0 | Status: SHIPPED | OUTPATIENT
Start: 2024-09-16

## 2024-09-16 RX ORDER — AMLODIPINE BESYLATE 10 MG/1
10 TABLET ORAL
Qty: 90 TABLET | Refills: 0 | Status: SHIPPED | OUTPATIENT
Start: 2024-09-16

## 2024-09-17 DIAGNOSIS — R79.89 ELEVATED LFTS: ICD-10-CM

## 2024-09-17 DIAGNOSIS — D58.2 ELEVATED HEMOGLOBIN: Primary | ICD-10-CM

## 2024-09-17 LAB
ALBUMIN SERPL-MCNC: 4.6 G/DL (ref 3.5–5.2)
ALBUMIN/GLOB SERPL: 1.4 G/DL
ALP SERPL-CCNC: 87 U/L (ref 39–117)
ALT SERPL W P-5'-P-CCNC: 57 U/L (ref 1–41)
ANION GAP SERPL CALCULATED.3IONS-SCNC: 11 MMOL/L (ref 5–15)
AST SERPL-CCNC: 41 U/L (ref 1–40)
BILIRUB SERPL-MCNC: 0.8 MG/DL (ref 0–1.2)
BUN SERPL-MCNC: 17 MG/DL (ref 6–20)
BUN/CREAT SERPL: 16.8 (ref 7–25)
CALCIUM SPEC-SCNC: 9.9 MG/DL (ref 8.6–10.5)
CHLORIDE SERPL-SCNC: 103 MMOL/L (ref 98–107)
CHOLEST SERPL-MCNC: 230 MG/DL (ref 0–200)
CO2 SERPL-SCNC: 25 MMOL/L (ref 22–29)
CREAT SERPL-MCNC: 1.01 MG/DL (ref 0.76–1.27)
EGFRCR SERPLBLD CKD-EPI 2021: 94.6 ML/MIN/1.73
FERRITIN SERPL-MCNC: 648 NG/ML (ref 30–400)
GLOBULIN UR ELPH-MCNC: 3.2 GM/DL
GLUCOSE SERPL-MCNC: 91 MG/DL (ref 65–99)
HBA1C MFR BLD: 5.5 % (ref 4.8–5.6)
HCV AB SER QL: NORMAL
HDLC SERPL-MCNC: 30 MG/DL (ref 40–60)
LDLC SERPL CALC-MCNC: 174 MG/DL (ref 0–100)
LDLC/HDLC SERPL: 5.72 {RATIO}
POTASSIUM SERPL-SCNC: 4.2 MMOL/L (ref 3.5–5.2)
PROT SERPL-MCNC: 7.8 G/DL (ref 6–8.5)
SODIUM SERPL-SCNC: 139 MMOL/L (ref 136–145)
TRIGL SERPL-MCNC: 142 MG/DL (ref 0–150)
TSH SERPL DL<=0.05 MIU/L-ACNC: 1.66 UIU/ML (ref 0.27–4.2)
VLDLC SERPL-MCNC: 26 MG/DL (ref 5–40)

## 2024-09-17 RX ORDER — FENOFIBRATE 145 MG/1
145 TABLET, COATED ORAL DAILY
Qty: 90 TABLET | Refills: 1 | Status: SHIPPED | OUTPATIENT
Start: 2024-09-17

## 2024-09-18 ENCOUNTER — CLINICAL SUPPORT (OUTPATIENT)
Dept: FAMILY MEDICINE CLINIC | Facility: CLINIC | Age: 44
End: 2024-09-18
Payer: COMMERCIAL

## 2024-09-18 VITALS — DIASTOLIC BLOOD PRESSURE: 100 MMHG | SYSTOLIC BLOOD PRESSURE: 182 MMHG

## 2024-09-18 RX ORDER — METOPROLOL SUCCINATE 25 MG/1
50 TABLET, EXTENDED RELEASE ORAL
Qty: 180 TABLET | Refills: 0 | Status: SHIPPED | OUTPATIENT
Start: 2024-09-18 | End: 2024-12-17

## 2024-09-19 ENCOUNTER — TELEPHONE (OUTPATIENT)
Dept: FAMILY MEDICINE CLINIC | Facility: CLINIC | Age: 44
End: 2024-09-19
Payer: COMMERCIAL

## 2024-09-20 ENCOUNTER — CLINICAL SUPPORT (OUTPATIENT)
Dept: FAMILY MEDICINE CLINIC | Facility: CLINIC | Age: 44
End: 2024-09-20
Payer: COMMERCIAL

## 2024-09-20 VITALS — SYSTOLIC BLOOD PRESSURE: 182 MMHG | DIASTOLIC BLOOD PRESSURE: 100 MMHG

## 2024-09-20 DIAGNOSIS — I16.0 HYPERTENSIVE URGENCY: Primary | ICD-10-CM

## 2024-10-03 ENCOUNTER — HOSPITAL ENCOUNTER (OUTPATIENT)
Dept: ULTRASOUND IMAGING | Facility: HOSPITAL | Age: 44
Discharge: HOME | End: 2024-10-03
Admitting: NURSE PRACTITIONER
Payer: COMMERCIAL

## 2024-10-03 DIAGNOSIS — R79.89 ELEVATED LFTS: ICD-10-CM

## 2024-10-03 PROCEDURE — 76705 ECHO EXAM OF ABDOMEN: CPT

## 2024-10-04 ENCOUNTER — OFFICE VISIT (OUTPATIENT)
Dept: FAMILY MEDICINE CLINIC | Facility: CLINIC | Age: 44
End: 2024-10-04
Payer: COMMERCIAL

## 2024-10-04 VITALS
BODY MASS INDEX: 32.98 KG/M2 | HEIGHT: 66 IN | DIASTOLIC BLOOD PRESSURE: 90 MMHG | OXYGEN SATURATION: 98 % | SYSTOLIC BLOOD PRESSURE: 162 MMHG | HEART RATE: 57 BPM | WEIGHT: 205.2 LBS

## 2024-10-04 DIAGNOSIS — R79.89 ELEVATED LFTS: Primary | ICD-10-CM

## 2024-10-04 DIAGNOSIS — I10 PRIMARY HYPERTENSION: ICD-10-CM

## 2024-10-04 DIAGNOSIS — E78.2 MIXED HYPERLIPIDEMIA: ICD-10-CM

## 2024-10-04 PROCEDURE — 99214 OFFICE O/P EST MOD 30 MIN: CPT | Performed by: NURSE PRACTITIONER

## 2024-10-04 NOTE — PROGRESS NOTES
"Chief Complaint  Hypertension (Has been in the 130/80 range this week)    Subjective        Smith Fletcher presents to [facility]  History of Present Illness    Patient presents for follow-up visit regarding hypertension.  He is prescribed amlodipine 10 mg daily, losartan 50 mg daily and metoprolol XL 50 mg daily.  Blood pressure is not at goal today but it is improving. Patient reports at home, ranging 130's/80's. He reports all readings have been < 140/90. Tricor 145 mg daily started for hyperlipidemia.     Elevated liver enzymes, liver US ordered - results pending.     Objective   Vital Signs:  /90 (BP Location: Left arm, Patient Position: Sitting, Cuff Size: Adult)   Pulse 57   Ht 167.6 cm (66\")   Wt 93.1 kg (205 lb 3.2 oz)   SpO2 98%   BMI 33.12 kg/m²   Estimated body mass index is 33.12 kg/m² as calculated from the following:    Height as of this encounter: 167.6 cm (66\").    Weight as of this encounter: 93.1 kg (205 lb 3.2 oz).          Physical Exam  Constitutional:       Appearance: Normal appearance.   HENT:      Head: Normocephalic.   Cardiovascular:      Rate and Rhythm: Normal rate and regular rhythm.   Pulmonary:      Effort: Pulmonary effort is normal.      Breath sounds: Normal breath sounds.   Abdominal:      General: Abdomen is flat. Bowel sounds are normal.      Palpations: Abdomen is soft.   Musculoskeletal:         General: Normal range of motion.      Cervical back: Neck supple.      Right lower leg: No edema.      Left lower leg: No edema.   Skin:     General: Skin is warm and dry.   Neurological:      Mental Status: He is alert and oriented to person, place, and time.      Gait: Gait is intact.   Psychiatric:         Attention and Perception: Attention normal.         Mood and Affect: Mood normal.         Speech: Speech normal.        Result Review :    CMP          9/9/2024    20:32 9/10/2024    13:06 9/16/2024    15:39   [facility]   Glucose 101   91  "   BUN 11   17    Creatinine 1.11   1.01    EGFR 84.5   94.6    Sodium 139   139    Potassium 3.6  3.7  4.2    Chloride 101   103    Calcium 9.7   9.9    Total Protein 7.9   7.8    Albumin 4.5   4.6    Globulin 3.4   3.2    Total Bilirubin 0.6   0.8    Alkaline Phosphatase 86   87    AST (SGOT) 50   41    ALT (SGPT) 52   57    Albumin/Globulin Ratio 1.3   1.4    BUN/Creatinine Ratio 9.9   16.8    Anion Gap 13.9   11.0      CBC          9/9/2024    20:32 9/11/2024    06:16 9/16/2024    15:39   CBC   WBC 10.81  10.03  8.62    RBC 5.71  6.12  6.05    Hemoglobin 16.9  17.6  18.0    Hematocrit 48.4  53.3  52.1    MCV 84.8  87.1  86.1    MCH 29.6  28.8  29.8    MCHC 34.9  33.0  34.5    RDW 12.1  12.6  12.6    Platelets 287  275  356      Lipid Panel          9/16/2024    15:39   Lipid Panel   Total Cholesterol 230    Triglycerides 142    HDL Cholesterol 30    VLDL Cholesterol 26    LDL Cholesterol  174    LDL/HDL Ratio 5.72      TSH          9/9/2024    20:32 9/16/2024    15:39   TSH   TSH 1.700  1.660      Most Recent A1C          9/16/2024    15:39   HGBA1C Most Recent   Hemoglobin A1C 5.50                Assessment and Plan   Diagnoses and all orders for this visit:    1. Elevated LFTs (Primary)  Assessment & Plan:  Liver ultrasound results are pending, will notify patient of results when available      2. Primary hypertension  Assessment & Plan:  Not at goal of less than 140/90 at visit but we have discussed and reviewed his readings at home, all of which are at goal  We will continue medications as prescribed for now  Patient is walking daily and should continue exercising, limiting sodium, caffeine and alcohol  Follow-up in 8 weeks as scheduled  Continue to monitor at home and call if greater than 140/90  Patient may return to work at conclusion of Fall Break      3. Mixed hyperlipidemia  Assessment & Plan:  Labs reviewed  Continue fenofibrate as prescribed  Repeat lipids in December             I spent 30 minutes  caring for Smith on this date of service. This time includes time spent by me in the following activities:preparing for the visit, reviewing tests, obtaining and/or reviewing a separately obtained history, performing a medically appropriate examination and/or evaluation , counseling and educating the patient/family/caregiver, documenting information in the medical record, and care coordination  Follow Up   Return for Next scheduled follow up.  Patient was given instructions and counseling regarding his condition or for health maintenance advice. Please see specific information pulled into the AVS if appropriate.

## 2024-10-04 NOTE — ASSESSMENT & PLAN NOTE
Not at goal of less than 140/90 at visit but we have discussed and reviewed his readings at home, all of which are at goal  We will continue medications as prescribed for now  Patient is walking daily and should continue exercising, limiting sodium, caffeine and alcohol  Follow-up in 8 weeks as scheduled  Continue to monitor at home and call if greater than 140/90  Patient may return to work at conclusion of Fall Break

## 2024-10-24 DIAGNOSIS — I16.0 HYPERTENSIVE URGENCY: ICD-10-CM

## 2024-10-24 NOTE — TELEPHONE ENCOUNTER
Caller: Smith Fletcher    Relationship: Self    Best call back number: [phone]    Requested Prescriptions:   Requested Prescriptions     Pending Prescriptions Disp Refills    amLODIPine (NORVASC) 10 MG tablet 90 tablet 0     Sig: Take 1 tablet by mouth Daily.    metoprolol succinate XL (TOPROL-XL) 25 MG 24 hr tablet 180 tablet 0     Sig: Take 2 tablets by mouth Daily for 90 days.        Pharmacy where request should be sent: [id number] [address] - [phone]  - [phone]     Last office visit with prescribing clinician: 10/4/2024   Last telemedicine visit with prescribing clinician: Visit date not found   Next office visit with prescribing clinician: 12/2/2024     Additional details provided by patient: PATIENT HAS 5 LEFT ON BOTH                Rola Holliday, PCT   10/24/24 15:29 EDT

## 2024-10-25 RX ORDER — AMLODIPINE BESYLATE 10 MG/1
10 TABLET ORAL
Qty: 90 TABLET | Refills: 0 | Status: SHIPPED | OUTPATIENT
Start: 2024-10-25

## 2024-10-25 RX ORDER — METOPROLOL SUCCINATE 25 MG/1
50 TABLET, EXTENDED RELEASE ORAL
Qty: 180 TABLET | Refills: 0 | Status: SHIPPED | OUTPATIENT
Start: 2024-10-25 | End: 2025-01-23

## 2024-12-02 ENCOUNTER — OFFICE VISIT (OUTPATIENT)
Dept: FAMILY MEDICINE CLINIC | Facility: CLINIC | Age: 44
End: 2024-12-02
Payer: COMMERCIAL

## 2024-12-02 VITALS
OXYGEN SATURATION: 98 % | SYSTOLIC BLOOD PRESSURE: 142 MMHG | DIASTOLIC BLOOD PRESSURE: 80 MMHG | BODY MASS INDEX: 31.34 KG/M2 | WEIGHT: 195 LBS | HEIGHT: 66 IN | HEART RATE: 80 BPM

## 2024-12-02 DIAGNOSIS — I10 PRIMARY HYPERTENSION: Primary | ICD-10-CM

## 2024-12-02 DIAGNOSIS — R79.89 ELEVATED LFTS: ICD-10-CM

## 2024-12-02 DIAGNOSIS — D58.2 ELEVATED HEMOGLOBIN: ICD-10-CM

## 2024-12-02 DIAGNOSIS — E78.2 MIXED HYPERLIPIDEMIA: ICD-10-CM

## 2024-12-02 DIAGNOSIS — R79.89 ELEVATED FERRITIN: ICD-10-CM

## 2024-12-02 PROCEDURE — 80053 COMPREHEN METABOLIC PANEL: CPT | Performed by: NURSE PRACTITIONER

## 2024-12-02 PROCEDURE — 36415 COLL VENOUS BLD VENIPUNCTURE: CPT | Performed by: NURSE PRACTITIONER

## 2024-12-02 PROCEDURE — 85027 COMPLETE CBC AUTOMATED: CPT | Performed by: NURSE PRACTITIONER

## 2024-12-02 PROCEDURE — 82728 ASSAY OF FERRITIN: CPT | Performed by: NURSE PRACTITIONER

## 2024-12-02 PROCEDURE — 99214 OFFICE O/P EST MOD 30 MIN: CPT | Performed by: NURSE PRACTITIONER

## 2024-12-02 PROCEDURE — 80061 LIPID PANEL: CPT | Performed by: NURSE PRACTITIONER

## 2024-12-02 RX ORDER — FENOFIBRATE 145 MG/1
145 TABLET, COATED ORAL DAILY
Qty: 90 TABLET | Refills: 1 | Status: SHIPPED | OUTPATIENT
Start: 2024-12-02

## 2024-12-02 RX ORDER — LOSARTAN POTASSIUM 50 MG/1
50 TABLET ORAL
Qty: 90 TABLET | Refills: 0 | Status: SHIPPED | OUTPATIENT
Start: 2024-12-02

## 2024-12-02 NOTE — PROGRESS NOTES
Venipuncture Blood Specimen Collection  Venipuncture performed in the left arm by Marguerite Jang MA with good hemostasis. Patient tolerated the procedure well without complications.   12/02/24   Marguerite Jang MA

## 2024-12-02 NOTE — PROGRESS NOTES
"Chief Complaint  Follow-up (Follow up HTN )    Subjective        Smith Fletcher presents to [facility]  History of Present Illness    Patient presents for follow-up visit regarding hypertension. He is prescribed amlodipine 10 mg daily, losartan 50 mg daily and metoprolol XL 50 mg daily. Blood pressure is not at goal today but it is improving. Patient reports at home, ranging 113/68 - 121/82. He does report some fatigue with the added Metoprolol, seeing heart rates in the 40's/50's.     Elevated hemoglobin with hematocrit noted on patient's most recent labs.  Ferritin was checked and also elevated. Patient denies taking any supplemental iron.     Objective   Vital Signs:  /80 (BP Location: Left arm, Patient Position: Sitting, Cuff Size: Large Adult)   Pulse 80   Ht 167.6 cm (66\")   Wt 88.5 kg (195 lb)   SpO2 98%   BMI 31.47 kg/m²   Estimated body mass index is 31.47 kg/m² as calculated from the following:    Height as of this encounter: 167.6 cm (66\").    Weight as of this encounter: 88.5 kg (195 lb).          Physical Exam  Constitutional:       Appearance: Normal appearance.   HENT:      Head: Normocephalic.   Cardiovascular:      Rate and Rhythm: Normal rate and regular rhythm.   Pulmonary:      Effort: Pulmonary effort is normal.      Breath sounds: Normal breath sounds.   Abdominal:      General: Abdomen is flat. Bowel sounds are normal.      Palpations: Abdomen is soft.   Musculoskeletal:         General: Normal range of motion.      Cervical back: Neck supple.      Right lower leg: No edema.      Left lower leg: No edema.   Skin:     General: Skin is warm and dry.   Neurological:      Mental Status: He is alert and oriented to person, place, and time.      Gait: Gait is intact.   Psychiatric:         Attention and Perception: Attention normal.         Mood and Affect: Mood normal.         Speech: Speech normal.        Result Review :    [facility]          9/9/2024    20:32 " 9/10/2024    13:06 9/16/2024    15:39   CMP   Glucose 101   91    BUN 11   17    Creatinine 1.11   1.01    EGFR 84.5   94.6    Sodium 139   139    Potassium 3.6  3.7  4.2    Chloride 101   103    Calcium 9.7   9.9    Total Protein 7.9   7.8    Albumin 4.5   4.6    Globulin 3.4   3.2    Total Bilirubin 0.6   0.8    Alkaline Phosphatase 86   87    AST (SGOT) 50   41    ALT (SGPT) 52   57    Albumin/Globulin Ratio 1.3   1.4    BUN/Creatinine Ratio 9.9   16.8    Anion Gap 13.9   11.0      CBC          9/9/2024    20:32 9/11/2024    06:16 9/16/2024    15:39   CBC   WBC 10.81  10.03  8.62    RBC 5.71  6.12  6.05    Hemoglobin 16.9  17.6  18.0    Hematocrit 48.4  53.3  52.1    MCV 84.8  87.1  86.1    MCH 29.6  28.8  29.8    MCHC 34.9  33.0  34.5    RDW 12.1  12.6  12.6    Platelets 287  275  356      Lipid Panel          9/16/2024    15:39   Lipid Panel   Total Cholesterol 230    Triglycerides 142    HDL Cholesterol 30    VLDL Cholesterol 26    LDL Cholesterol  174    LDL/HDL Ratio 5.72      TSH          9/9/2024    20:32 9/16/2024    15:39   TSH   TSH 1.700  1.660      Most Recent A1C          9/16/2024    15:39   HGBA1C Most Recent   Hemoglobin A1C 5.50                Assessment and Plan   Diagnoses and all orders for this visit:    1. Primary hypertension (Primary)  Assessment & Plan:  Hypertension is stable and controlled  Continue current treatment regimen.  Dietary sodium restriction.  Weight loss.  Regular aerobic exercise.  Ambulatory blood pressure monitoring.  Blood pressure will be reassessed in 3 months.    Orders:  -     losartan (COZAAR) 50 MG tablet; Take 1 tablet by mouth Daily.  Dispense: 90 tablet; Refill: 0    2. Elevated LFTs  -     Comprehensive Metabolic Panel    3. Elevated hemoglobin  Assessment & Plan:  If remains elevated with elevated ferritin, refer to Hematology    Orders:  -     CBC (No Diff)    4. Elevated ferritin  -     CBC (No Diff)  -     Ferritin    5. Mixed hyperlipidemia  -     Lipid  Panel  -     fenofibrate (TRICOR) 145 MG tablet; Take 1 tablet by mouth Daily.  Dispense: 90 tablet; Refill: 1           I spent 25 minutes caring for Smith on this date of service. This time includes time spent by me in the following activities:preparing for the visit, reviewing tests, obtaining and/or reviewing a separately obtained history, performing a medically appropriate examination and/or evaluation , counseling and educating the patient/family/caregiver, ordering medications, tests, or procedures, documenting information in the medical record, and care coordination  Follow Up   Return in about 3 months (around 3/2/2025) for HTN.  Patient was given instructions and counseling regarding his condition or for health maintenance advice. Please see specific information pulled into the AVS if appropriate.

## 2024-12-03 DIAGNOSIS — R79.89 ELEVATED FERRITIN LEVEL: Primary | ICD-10-CM

## 2024-12-03 LAB
ALBUMIN SERPL-MCNC: 4.3 G/DL (ref 3.5–5.2)
ALBUMIN/GLOB SERPL: 1.1 G/DL
ALP SERPL-CCNC: 59 U/L (ref 39–117)
ALT SERPL W P-5'-P-CCNC: 16 U/L (ref 1–41)
ANION GAP SERPL CALCULATED.3IONS-SCNC: 11.8 MMOL/L (ref 5–15)
AST SERPL-CCNC: 25 U/L (ref 1–40)
BILIRUB SERPL-MCNC: 0.5 MG/DL (ref 0–1.2)
BUN SERPL-MCNC: 21 MG/DL (ref 6–20)
BUN/CREAT SERPL: 16.5 (ref 7–25)
CALCIUM SPEC-SCNC: 9.6 MG/DL (ref 8.6–10.5)
CHLORIDE SERPL-SCNC: 102 MMOL/L (ref 98–107)
CHOLEST SERPL-MCNC: 204 MG/DL (ref 0–200)
CO2 SERPL-SCNC: 25.2 MMOL/L (ref 22–29)
CREAT SERPL-MCNC: 1.27 MG/DL (ref 0.76–1.27)
DEPRECATED RDW RBC AUTO: 42.1 FL (ref 37–54)
EGFRCR SERPLBLD CKD-EPI 2021: 71.9 ML/MIN/1.73
ERYTHROCYTE [DISTWIDTH] IN BLOOD BY AUTOMATED COUNT: 13 % (ref 12.3–15.4)
FERRITIN SERPL-MCNC: 662 NG/ML (ref 30–400)
GLOBULIN UR ELPH-MCNC: 3.8 GM/DL
GLUCOSE SERPL-MCNC: 74 MG/DL (ref 65–99)
HCT VFR BLD AUTO: 47.2 % (ref 37.5–51)
HDLC SERPL-MCNC: 33 MG/DL (ref 40–60)
HGB BLD-MCNC: 15.6 G/DL (ref 13–17.7)
LDLC SERPL CALC-MCNC: 142 MG/DL (ref 0–100)
LDLC/HDLC SERPL: 4.22 {RATIO}
MCH RBC QN AUTO: 29.3 PG (ref 26.6–33)
MCHC RBC AUTO-ENTMCNC: 33.1 G/DL (ref 31.5–35.7)
MCV RBC AUTO: 88.6 FL (ref 79–97)
PLATELET # BLD AUTO: 268 10*3/MM3 (ref 140–450)
PMV BLD AUTO: 11.3 FL (ref 6–12)
POTASSIUM SERPL-SCNC: 3.9 MMOL/L (ref 3.5–5.2)
PROT SERPL-MCNC: 8.1 G/DL (ref 6–8.5)
RBC # BLD AUTO: 5.33 10*6/MM3 (ref 4.14–5.8)
SODIUM SERPL-SCNC: 139 MMOL/L (ref 136–145)
TRIGL SERPL-MCNC: 158 MG/DL (ref 0–150)
VLDLC SERPL-MCNC: 29 MG/DL (ref 5–40)
WBC NRBC COR # BLD AUTO: 7.19 10*3/MM3 (ref 3.4–10.8)

## 2024-12-10 ENCOUNTER — TELEPHONE (OUTPATIENT)
Dept: ONCOLOGY | Facility: CLINIC | Age: 44
End: 2024-12-10
Payer: COMMERCIAL

## 2024-12-10 NOTE — TELEPHONE ENCOUNTER
*Called to [facility] New Pt appt from Ref. No answer, left v/m for call back. Per Dr Richard, please [facility] in next six weeks. Can s/w any

## 2025-01-24 DIAGNOSIS — I16.0 HYPERTENSIVE URGENCY: ICD-10-CM

## 2025-01-27 RX ORDER — AMLODIPINE BESYLATE 10 MG/1
10 TABLET ORAL
Qty: 90 TABLET | Refills: 0 | Status: SHIPPED | OUTPATIENT
Start: 2025-01-27

## 2025-01-27 RX ORDER — METOPROLOL SUCCINATE 25 MG/1
50 TABLET, EXTENDED RELEASE ORAL
Qty: 180 TABLET | Refills: 0 | Status: SHIPPED | OUTPATIENT
Start: 2025-01-27 | End: 2025-04-27

## 2025-01-28 NOTE — PROGRESS NOTES
HEMATOLOGY ONCOLOGY OUTPATIENT CONSULTATION       Patient name: Smith Fletcher  : 1980  MRN: [id number]  Primary Care Physician: Rayna Nichols APRN  Referring Physician: Rayna Nichols AP*  Reason For Consult: elevated ferritin level      History of Present Illness:  Patient is a 44 y.o. male with past medical history significant for hypertension complicated by hypertensive urgency, hyperlipidemia, elevated LFTs, with elevated hemoglobin and elevated ferritin presenting for consultation for elevated ferritin level.    -2024  pt ended up in hospital for hypertensive urgency, feels he might have been exposed to COVID around then.  -2024 CBC showing WBC 10.81, hemoglobin 16.9/hematocrit 48.4 with normal indices, platelets 287K, CMP within normal limits with creatinine 1.01 and AST 41/ALT 57 (H).  Ferritin 648 ng/mL (H). Hep C Ab negative, TSH normal.  -10/3/2024 US liver: hepatic echogenicity is diffusely increased felt to represent hepatic steatosis. no focal hepatic masses.     -2024 CBC showing WBC 7.19, hemoglobin 15.6/hematocrit 47.2 with normal indices, platelets 268K, CMP within normal limits with creatinine 1.27.  Ferritin 662 ng/mL (H).      Subjective:  -2025 Patient presents for initial consultation some fatigue in 2024 thought it was medication but he has been better, and years of achy non-swollen joints (neck, back, left knee, b/l hands/fingers) denies rashes. He denies unintentional weight loss (has intentionally lost 30# in last 4 mo with diet change, stopped soft drinks (60 oz to 0) and increased activity), denies fevers, chills, drenching night sweats, lymphadenopathy.   -No family history of hemachromatosis, early liver failure, early heart failure. Mom with DE LA CRUZ but is overweight.      Past Medical History:   Diagnosis Date    Headache     Hypertension      Past Surgical History:   Procedure Laterality Date    WISDOM TOOTH EXTRACTION          Current Outpatient Medications:     albuterol sulfate  (90 Base) MCG/ACT inhaler, Inhale 2 puffs Every 4 (Four) Hours As Needed for Wheezing., Disp: 8 g, Rfl: 1    amLODIPine (NORVASC) 10 MG tablet, Take 1 tablet by mouth Daily., Disp: 90 tablet, Rfl: 0    fenofibrate (TRICOR) 145 MG tablet, Take 1 tablet by mouth Daily., Disp: 90 tablet, Rfl: 1    losartan (COZAAR) 50 MG tablet, Take 1 tablet by mouth Daily., Disp: 90 tablet, Rfl: 0    metoprolol succinate XL (TOPROL-XL) 25 MG 24 hr tablet, Take 2 tablets by mouth Daily for 90 days., Disp: 180 tablet, Rfl: 0    Allergies   Allergen Reactions    Codeine Nausea And Vomiting       Family History   Problem Relation Age of Onset    Hypertension Mother     Other (nonalcoholic fatty liver) Mother     Hypertension Father     Hyperlipidemia Father     Hypertension Maternal Grandmother     Stroke Maternal Grandmother     Coronary artery disease Maternal Grandmother        Cancer-related family history is not on file.      Social History     Tobacco Use    Smoking status: Never     Passive exposure: Never    Smokeless tobacco: Never   Vaping Use    Vaping status: Never Used   Substance Use Topics    Alcohol use: Not Currently     Comment: hx of very rare glass of wine prior    Drug use: Never     Social History     Social History Narrative    Not on file       Review of Systems:  Constitutional: +has intentionally lost 30# in last 4 mo Denies any weakness, fatigue, lack of appetite, excessive appetite chills or fever.  Eyes: Reports no blurry vision, no double vision, no pain in the eyes, dry eyes or tearing.  ENT: Reports no decreased hearing capacity, ear pain or tinnitus. Denies any epistaxis, nasal congestion, mouth sores or bleeding, tooth or jaw pain, sore throat or hoarseness.  Respiratory: Denies any cough, sputum production or hemoptysis. There is no wheezing, shortness of breath or any other respiratory complaints.  Cardiovascular: Denies any chest  "pain, shortness of breath when lying down, shortness of breath with activity, palpitations, or leg swelling.  Breasts: Denies any lumps, bumps, pain, nipple changes or discharge in the breasts.  Gastrointestinal: There are no complaints of abdominal pain, difficulty swallowing or painful swallowing, anorexia, nausea, vomiting, diarrhea, constipation, heartburn, blood in the stools, dark stools, or change in bowel habits or hemorrhoids.  Genitourinary: Denies any pain or burning on urination, blood in the urine or frequent urination. Denies erectile dysfunction.  Musculoskeletal: + chronic painful joints (neck, back, left knee, b/l hands/fingers)-stable, Denies swelling of the joints, muscle or back pain. Denies any pain or tingling in the legs, hands or feet.  Neuropsychiatric: Denies any nervousness, depressed mood, headaches, blackouts, dizziness, weakness of limbs, loss of sensation, loss of balance, loss of coordination or difficulty in speaking.  Cutaneous: Denies any dry skin, itching, rash, change in the color of the skin, or any other cutaneous complaints.  Hematologic/Lymphatic: Denies any bruising or bleeding. Report no recent development of palpable or painful lymph nodes.  Allergy/Immunology: Denies rash/hayfever symptoms or any recent history of pneumonia, recurrent sinusitis, urinary infection or any other history of an ongoing infection.  Endocrine: Reports no intolerance to heat or cold, excessive thirst or excessive urination.      Objective:    Vital Signs:  Vitals:    25 0915   BP: 158/96   Pulse: 59   Temp: 98.6 °F (37 °C)   TempSrc: Oral   SpO2: 100%   Weight: 87.7 kg (193 lb 6.4 oz)   Height: 167.6 cm (65.98\")   PainSc: 0-No pain     Body mass index is 31.23 kg/m².      Physical Exam:   ECO  GENERAL: The patient is a pleasant middle aged white male who appears their stated age and is awake, in no acute distress, alert and oriented x3.  SKIN: No rash or ulcers.   HEME/LYMPHATICS: No " bruising or petechiae on visual inspection. No lymphadenopathy on visual inspection and palpation of cervical, supraclavicular, infraclavicular lymph nodes.  HEAD/FACE: atraumatic and normocephalic. Normal hair.  EYES: PERRLA/EOMI. No scleral icterus. No tearing or dry eyes.  ENT: External ears normal with no evidence of discharge. No evidence of ulceration or bleeding in the nostrils. Mouth has no ulcers, bleeding or inflammation of the gums, floor or roof of the mouth with normal dentition for age.  NECK: Supple, symmetric.   CHEST/RESPIRATORY: Expansion maintained bilaterally and symmetrically. On auscultation, clear breath sounds in both lungs. No wheezes, rhonchi or rales.  BREAST: Deferred.  CARDIOVASCULAR: On auscultation, regular rate and rhythm. No murmurs, gallops or rubs are heard.  GASTROINTESTINAL/ABDOMEN: Symmetric. On auscultation of the abdomen, there are normal bowel sounds in all four quadrants. Non-tender to palpation.  GENITOURINARY: Deferred.  NEUROLOGIC: Alert and oriented time, person, and place, with no apparent changes in recent or remote memory. Cranial nerves II-XII are grossly intact. Sensation is maintained in the extremities. Strength and tone appear normal for age and equal in the extremities. Gait is normal.  MUSCULOSKELETAL: No cyanosis, clubbing or edema in the extremities, no swelling or redness of the hand or finger joints. There are no surgical scars on the extremities.  PSYCHIATRIC: The patient maintains judgment and has good insight. The patient has no changes in mood or affection.     Lab Results - Last 18 Months   Lab Units 12/02/24  1351 09/16/24  1539 09/11/24  0616   WBC 10*3/mm3 7.19 8.62 10.03   HEMOGLOBIN g/dL 15.6 18.0* 17.6   HEMATOCRIT % 47.2 52.1* 53.3*   PLATELETS 10*3/mm3 268 356 275   MCV fL 88.6 86.1 87.1     Lab Results - Last 18 Months   Lab Units 12/02/24  1351 09/16/24  1539 09/10/24  1306 09/09/24  2032   SODIUM mmol/L 139 139  --  139   POTASSIUM mmol/L  "3.9 4.2 3.7 3.6   CHLORIDE mmol/L 102 103  --  101   CO2 mmol/L 25.2 25.0  --  24.1   BUN mg/dL 21* 17  --  11   CREATININE mg/dL 1.27 1.01  --  1.11   CALCIUM mg/dL 9.6 9.9  --  9.7   BILIRUBIN mg/dL 0.5 0.8  --  0.6   ALK PHOS U/L 59 87  --  86   ALT (SGPT) U/L 16 57*  --  52*   AST (SGOT) U/L 25 41*  --  50*   GLUCOSE mg/dL 74 91  --  101*       Lab Results   Component Value Date    GLUCOSE 74 12/02/2024    BUN 21 (H) 12/02/2024    CREATININE 1.27 12/02/2024    BCR 16.5 12/02/2024    K 3.9 12/02/2024    CO2 25.2 12/02/2024    CALCIUM 9.6 12/02/2024    ALBUMIN 4.3 12/02/2024    AST 25 12/02/2024    ALT 16 12/02/2024       No results for input(s): \"APTT\", \"INR\", \"PTT\" in the last 42947 hours.    Lab Results   Component Value Date    FERRITIN 662.00 (H) 12/02/2024       No results found for: \"FOLATE\"    No results found for: \"OCCULTBLD\"    No results found for: \"RETICCTPCT\"  No results found for: \"DNEDVEDD19\"  No results found for: \"SPEP\", \"UPEP\"  No results found for: \"LDH\", \"URICACID\"  No results found for: \"CHELSI\", \"RF\", \"SEDRATE\"  No results found for: \"FIBRINOGEN\", \"HAPTOGLOBIN\"  No results found for: \"PTT\", \"INR\"  No results found for: \"\"  No results found for: \"CEA\"  No components found for: \"CA-19-9\"  No results found for: \"PSA\"  No results found for: \"SEDRATE\"       Assessment & Plan     44 y.o. male with past medical history significant for hypertension complicated by hypertensive urgency, hyperlipidemia, elevated LFTs, with elevated hemoglobin and elevated ferritin presenting for consultation for elevated ferritin level.    1. elevated ferritin level; 9/2024 in setting of illness, still present a few months later.  -No etoh history (very rare glass of wine)  -No family hx if HH  -ferritin could be an acute phase reactant. Ordered iron panel, ESR, CRP as well as repeat CBC, ferritin and CMP (given recent fatty liver)    - Next step would be HFE testing if confirmed iron overload.    RTC in 3-5 weeks to " review, will call in interim if any concerning findings, or if labs from today indicate next level of blood testing needs to be ordered    2. Recent diagnosis of non-alcoholic fatty liver  -pt already intentionally losing weight with stopping pop, eating smaller portions, more active, now down to acceptable BMI 31.2  -9/2024 hep C ab nonreactive  -will check hep B to ensure no latent infection    RTC in 3-5 weeks to review, will call in interim if any concerning findings, or if labs from today indicate next level of blood testing needs to be ordered      Thank you very much for providing the opportunity to participate in this patient’s care. Please do not hesitate to call if there are any other questions.

## 2025-01-30 ENCOUNTER — PATIENT ROUNDING (BHMG ONLY) (OUTPATIENT)
Dept: ONCOLOGY | Facility: CLINIC | Age: 45
End: 2025-01-30
Payer: COMMERCIAL

## 2025-01-30 ENCOUNTER — LAB (OUTPATIENT)
Dept: LAB | Facility: HOSPITAL | Age: 45
End: 2025-01-30
Payer: COMMERCIAL

## 2025-01-30 ENCOUNTER — CONSULT (OUTPATIENT)
Dept: ONCOLOGY | Facility: CLINIC | Age: 45
End: 2025-01-30
Payer: COMMERCIAL

## 2025-01-30 VITALS
TEMPERATURE: 98.6 F | HEART RATE: 59 BPM | HEIGHT: 66 IN | OXYGEN SATURATION: 100 % | BODY MASS INDEX: 31.08 KG/M2 | WEIGHT: 193.4 LBS | DIASTOLIC BLOOD PRESSURE: 96 MMHG | SYSTOLIC BLOOD PRESSURE: 158 MMHG

## 2025-01-30 DIAGNOSIS — Z11.59 NEED FOR HEPATITIS B SCREENING TEST: ICD-10-CM

## 2025-01-30 DIAGNOSIS — R79.89 ELEVATED FERRITIN: Primary | ICD-10-CM

## 2025-01-30 LAB
ALBUMIN SERPL-MCNC: 4.5 G/DL (ref 3.5–5.2)
ALBUMIN/GLOB SERPL: 1.4 G/DL
ALP SERPL-CCNC: 53 U/L (ref 39–117)
ALT SERPL W P-5'-P-CCNC: 17 U/L (ref 1–41)
ANION GAP SERPL CALCULATED.3IONS-SCNC: 8.1 MMOL/L (ref 5–15)
AST SERPL-CCNC: 29 U/L (ref 1–40)
BASOPHILS # BLD AUTO: 0.01 10*3/MM3 (ref 0–0.2)
BASOPHILS NFR BLD AUTO: 0.2 % (ref 0–1.5)
BILIRUB SERPL-MCNC: 0.6 MG/DL (ref 0–1.2)
BUN SERPL-MCNC: 15 MG/DL (ref 6–20)
BUN/CREAT SERPL: 12.8 (ref 7–25)
CALCIUM SPEC-SCNC: 9.9 MG/DL (ref 8.6–10.5)
CHLORIDE SERPL-SCNC: 105 MMOL/L (ref 98–107)
CO2 SERPL-SCNC: 24.9 MMOL/L (ref 22–29)
CREAT SERPL-MCNC: 1.17 MG/DL (ref 0.76–1.27)
CRP SERPL-MCNC: 0.36 MG/DL (ref 0–0.5)
DEPRECATED RDW RBC AUTO: 39.8 FL (ref 37–54)
EGFRCR SERPLBLD CKD-EPI 2021: 79.3 ML/MIN/1.73
EOSINOPHIL # BLD AUTO: 0.09 10*3/MM3 (ref 0–0.4)
EOSINOPHIL NFR BLD AUTO: 1.4 % (ref 0.3–6.2)
ERYTHROCYTE [DISTWIDTH] IN BLOOD BY AUTOMATED COUNT: 12.4 % (ref 12.3–15.4)
ERYTHROCYTE [SEDIMENTATION RATE] IN BLOOD: 5 MM/HR (ref 0–15)
FERRITIN SERPL-MCNC: 557 NG/ML (ref 30–400)
GLOBULIN UR ELPH-MCNC: 3.2 GM/DL
GLUCOSE SERPL-MCNC: 82 MG/DL (ref 65–99)
HCT VFR BLD AUTO: 45.1 % (ref 37.5–51)
HGB BLD-MCNC: 15.3 G/DL (ref 13–17.7)
IRON 24H UR-MRATE: 128 MCG/DL (ref 59–158)
IRON SATN MFR SERPL: 26 % (ref 20–50)
LYMPHOCYTES # BLD AUTO: 1.59 10*3/MM3 (ref 0.7–3.1)
LYMPHOCYTES NFR BLD AUTO: 25 % (ref 19.6–45.3)
MCH RBC QN AUTO: 30.3 PG (ref 26.6–33)
MCHC RBC AUTO-ENTMCNC: 33.9 G/DL (ref 31.5–35.7)
MCV RBC AUTO: 89.3 FL (ref 79–97)
MONOCYTES # BLD AUTO: 0.59 10*3/MM3 (ref 0.1–0.9)
MONOCYTES NFR BLD AUTO: 9.3 % (ref 5–12)
NEUTROPHILS NFR BLD AUTO: 4.09 10*3/MM3 (ref 1.7–7)
NEUTROPHILS NFR BLD AUTO: 64.1 % (ref 42.7–76)
PLATELET # BLD AUTO: 243 10*3/MM3 (ref 140–450)
PMV BLD AUTO: 10.1 FL (ref 6–12)
POTASSIUM SERPL-SCNC: 4.2 MMOL/L (ref 3.5–5.2)
PROT SERPL-MCNC: 7.7 G/DL (ref 6–8.5)
RBC # BLD AUTO: 5.05 10*6/MM3 (ref 4.14–5.8)
SODIUM SERPL-SCNC: 138 MMOL/L (ref 136–145)
TIBC SERPL-MCNC: 499 MCG/DL (ref 298–536)
TRANSFERRIN SERPL-MCNC: 335 MG/DL (ref 200–360)
WBC NRBC COR # BLD AUTO: 6.37 10*3/MM3 (ref 3.4–10.8)

## 2025-01-30 PROCEDURE — 84466 ASSAY OF TRANSFERRIN: CPT | Performed by: INTERNAL MEDICINE

## 2025-01-30 PROCEDURE — 36415 COLL VENOUS BLD VENIPUNCTURE: CPT | Performed by: INTERNAL MEDICINE

## 2025-01-30 PROCEDURE — 85652 RBC SED RATE AUTOMATED: CPT | Performed by: INTERNAL MEDICINE

## 2025-01-30 PROCEDURE — 83540 ASSAY OF IRON: CPT | Performed by: INTERNAL MEDICINE

## 2025-01-30 PROCEDURE — 80053 COMPREHEN METABOLIC PANEL: CPT | Performed by: INTERNAL MEDICINE

## 2025-01-30 PROCEDURE — 82728 ASSAY OF FERRITIN: CPT | Performed by: INTERNAL MEDICINE

## 2025-01-30 PROCEDURE — 86140 C-REACTIVE PROTEIN: CPT | Performed by: INTERNAL MEDICINE

## 2025-01-30 PROCEDURE — 85025 COMPLETE CBC W/AUTO DIFF WBC: CPT | Performed by: INTERNAL MEDICINE

## 2025-01-30 NOTE — PATIENT INSTRUCTIONS
Labs today, RTC in a few weeks to review- may need further testing depending on what today's labs show

## 2025-01-31 LAB
HBV CORE AB SERPL QL IA: NEGATIVE
HBV SURFACE AB SER QL: NON REACTIVE
HBV SURFACE AG SERPL QL IA: NEGATIVE
IMP & REVIEW OF LAB RESULTS: NORMAL
LABORATORY COMMENT REPORT: NORMAL

## 2025-02-03 NOTE — PROGRESS NOTES
February 3, 2025    Hello, may I speak with Smith Fletcher?    My name is Alma Tovar      I am  with [facility]  [address] [place] [address] [phone].    Before we get started may I verify your date of birth? 1980    I am calling to officially welcome you to our practice and ask about your recent visit. Is this a good time to talk? no    Tell me about your visit with us. What things went well?  A My Chart message was sent to the patient.         We're always looking for ways to make our patients' experiences even better. Do you have recommendations on ways we may improve?  no    Overall were you satisfied with your first visit to our practice? yes       I appreciate you taking the time to speak with me today. Is there anything else I can do for you? no      Thank you, and have a great day.

## 2025-02-27 NOTE — PROGRESS NOTES
HEMATOLOGY ONCOLOGY OUTPATIENT FOLLOW UP       Patient name: Smith Fletcher  : 1980  MRN: [id number]  Primary Care Physician: Rayna Nichols APRN  Referring Physician: Rayna Nichols AP*  Reason For Consult: elevated ferritin level    History of Present Illness:  Patient is a 44 y.o. male with past medical history significant for hypertension complicated by hypertensive urgency, hyperlipidemia, elevated LFTs, with elevated hemoglobin and elevated ferritin presenting for consultation for elevated ferritin level.    -2024  pt ended up in hospital for hypertensive urgency, feels he might have been exposed to COVID around then.  -2024 CBC showing WBC 10.81, hemoglobin 16.9/hematocrit 48.4 with normal indices, platelets 287K, CMP within normal limits with creatinine 1.01 and AST 41/ALT 57 (H).  Ferritin 648 ng/mL (H). Hep C Ab negative, TSH normal.  -10/3/2024 US liver: hepatic echogenicity is diffusely increased felt to represent hepatic steatosis. no focal hepatic masses.     -2024 CBC showing WBC 7.19, hemoglobin 15.6/hematocrit 47.2 with normal indices, platelets 268K, CMP within normal limits with creatinine 1.27.  Ferritin 662 ng/mL (H).    -2025 Patient presents for initial consultation some fatigue in 2024 thought it was medication but he has been better, and years of achy non-swollen joints (neck, back, left knee, b/l hands/fingers) denies rashes. He denies unintentional weight loss (has intentionally lost 30# in last 4 mo with diet change, stopped soft drinks (60 oz to 0) and increased activity), denies fevers, chills, drenching night sweats, lymphadenopathy.   -No family history of hemachromatosis, early liver failure, early heart failure. Mom with DE LA CRUZ but is overweight.      Subjective:  -2025 CBC with WBC 6.37 (normal differential) with hemoglobin 14.3/hematocrit 45.1 with normal indices, platelets 243K, CMP within normal limits, iron 128 (N), iron saturation 26%  (N) with normal transferrin and TIBC, ferritin elevated at 557 ng/mL, with CRP normal at 0.36, ESR normal at 5 mm/h, and hepatitis B surface antigen negative, surface antibody negative, core total antibody negative susceptible (never infected and no evidence of vaccination)    -3/3/25 Patient presents for follow-up still with fatigue, years of achy non-swollen joints (neck, back, left knee, b/l hands/fingers) denies rashes, to review his labs from consult.  His family and him have recently changed their diet due to his hypertension, otherwise no new issues.  -He is reporting back in September when he was sent in with hypertensive urgency, he was actually being seen at the PCP because of recent COVID diagnosis a few months before and also it had chronic sinus issues/possible sinus infection.    Past Medical History:   Diagnosis Date    Headache     Hypertension        Past Surgical History:   Procedure Laterality Date    WISDOM TOOTH EXTRACTION           Current Outpatient Medications:     albuterol sulfate  (90 Base) MCG/ACT inhaler, Inhale 2 puffs Every 4 (Four) Hours As Needed for Wheezing., Disp: 8 g, Rfl: 1    amLODIPine (NORVASC) 10 MG tablet, Take 1 tablet by mouth Daily., Disp: 90 tablet, Rfl: 0    fenofibrate (TRICOR) 145 MG tablet, Take 1 tablet by mouth Daily., Disp: 90 tablet, Rfl: 1    losartan (COZAAR) 50 MG tablet, Take 1 tablet by mouth Daily., Disp: 90 tablet, Rfl: 0    metoprolol succinate XL (TOPROL-XL) 25 MG 24 hr tablet, Take 2 tablets by mouth Daily for 90 days., Disp: 180 tablet, Rfl: 0    Allergies   Allergen Reactions    Codeine Nausea And Vomiting       Family History   Problem Relation Age of Onset    Hypertension Mother     Other (nonalcoholic fatty liver) Mother     Hypertension Father     Hyperlipidemia Father     Hypertension Maternal Grandmother     Stroke Maternal Grandmother     Coronary artery disease Maternal Grandmother        Cancer-related family history is not on  file.    Social History     Tobacco Use    Smoking status: Never     Passive exposure: Never    Smokeless tobacco: Never   Vaping Use    Vaping status: Never Used   Substance Use Topics    Alcohol use: Not Currently     Comment: hx of very rare glass of wine prior    Drug use: Never     Social History     Social History Narrative    Not on file      Review of Systems:  Constitutional: +has intentionally lost 30# in last 4 mo (weight stable today) denies any weakness, fatigue, lack of appetite, excessive appetite chills or fever.  Eyes: Reports no blurry vision, no double vision, no pain in the eyes, dry eyes or tearing.  ENT: Reports no decreased hearing capacity, ear pain or tinnitus. Denies any epistaxis, nasal congestion, mouth sores or bleeding, tooth or jaw pain, sore throat or hoarseness.  Respiratory: Denies any cough, sputum production or hemoptysis. There is no wheezing, shortness of breath or any other respiratory complaints.  Cardiovascular: Denies any chest pain, shortness of breath when lying down, shortness of breath with activity, palpitations, or leg swelling.  Breasts: Denies any lumps, bumps, pain, nipple changes or discharge in the breasts.  Gastrointestinal: There are no complaints of abdominal pain, difficulty swallowing or painful swallowing, anorexia, nausea, vomiting, diarrhea, constipation, heartburn, blood in the stools, dark stools, or change in bowel habits or hemorrhoids.  Genitourinary: Denies any pain or burning on urination, blood in the urine or frequent urination. Denies erectile dysfunction.  Musculoskeletal: + chronic painful joints (neck, back, left knee, b/l hands/fingers)-stable, Denies swelling of the joints, muscle or back pain. Denies any pain or tingling in the legs, hands or feet.  Neuropsychiatric: Denies any nervousness, depressed mood, headaches, blackouts, dizziness, weakness of limbs, loss of sensation, loss of balance, loss of coordination or difficulty in  "speaking.  Cutaneous: Denies any dry skin, itching, rash, change in the color of the skin, or any other cutaneous complaints.  Hematologic/Lymphatic: Denies any bruising or bleeding. Report no recent development of palpable or painful lymph nodes.  Allergy/Immunology: Denies rash/hayfever symptoms or any recent history of pneumonia, recurrent sinusitis, urinary infection or any other history of an ongoing infection.  Endocrine: Reports no intolerance to heat or cold, excessive thirst or excessive urination.    Objective:  Vital signs:  Vitals:    25 0918   BP: 148/92   Pulse: 50   Temp: 98 °F (36.7 °C)   TempSrc: Oral   SpO2: 100%   Weight: 89 kg (196 lb 3.2 oz)   Height: 167.6 cm (65.98\")   PainSc: 0-No pain     Body mass index is 31.68 kg/m².      Physical Exam:   ECO  GENERAL: The patient is a pleasant middle aged white male who appears their stated age and is awake, in no acute distress, alert and oriented x3.  SKIN: No rash or ulcers.   HEME/LYMPHATICS: No bruising or petechiae on visual inspection. No lymphadenopathy on visual inspection and palpation of cervical, supraclavicular, infraclavicular lymph nodes.  HEAD/FACE: atraumatic and normocephalic. Normal hair.  EYES: PERRLA/EOMI. No scleral icterus. No tearing or dry eyes.  ENT: External ears normal with no evidence of discharge. No evidence of ulceration or bleeding in the nostrils. Mouth has no ulcers, bleeding or inflammation of the gums, floor or roof of the mouth with normal dentition for age.  NECK: Supple, symmetric.   CHEST/RESPIRATORY: Expansion maintained bilaterally and symmetrically. On auscultation, clear breath sounds in both lungs. No wheezes, rhonchi or rales.  BREAST: Deferred.  CARDIOVASCULAR: On auscultation, regular rate and rhythm. No murmurs, gallops or rubs are heard.  GASTROINTESTINAL/ABDOMEN: Symmetric. On auscultation of the abdomen, there are normal bowel sounds in all four quadrants. Non-tender to " "palpation.  GENITOURINARY: Deferred.  NEUROLOGIC: Alert and oriented time, person, and place, with no apparent changes in recent or remote memory. Cranial nerves II-XII are grossly intact. Sensation is maintained in the extremities. Strength and tone appear normal for age and equal in the extremities. Gait is normal.  MUSCULOSKELETAL: No cyanosis, clubbing or edema in the extremities, no swelling or redness of the hand or finger joints. There are no surgical scars on the extremities.  PSYCHIATRIC: The patient maintains judgment and has good insight. The patient has no changes in mood or affection.     Lab Results - Last 18 Months   Lab Units 01/30/25  1019 12/02/24  1351 09/16/24  1539   WBC 10*3/mm3 6.37 7.19 8.62   HEMOGLOBIN g/dL 15.3 15.6 18.0*   HEMATOCRIT % 45.1 47.2 52.1*   PLATELETS 10*3/mm3 243 268 356   MCV fL 89.3 88.6 86.1     Lab Results - Last 18 Months   Lab Units 01/30/25  1019 12/02/24  1351 09/16/24  1539   SODIUM mmol/L 138 139 139   POTASSIUM mmol/L 4.2 3.9 4.2   CHLORIDE mmol/L 105 102 103   CO2 mmol/L 24.9 25.2 25.0   BUN mg/dL 15 21* 17   CREATININE mg/dL 1.17 1.27 1.01   CALCIUM mg/dL 9.9 9.6 9.9   BILIRUBIN mg/dL 0.6 0.5 0.8   ALK PHOS U/L 53 59 87   ALT (SGPT) U/L 17 16 57*   AST (SGOT) U/L 29 25 41*   GLUCOSE mg/dL 82 74 91       Lab Results   Component Value Date    GLUCOSE 82 01/30/2025    BUN 15 01/30/2025    CREATININE 1.17 01/30/2025    BCR 12.8 01/30/2025    K 4.2 01/30/2025    CO2 24.9 01/30/2025    CALCIUM 9.9 01/30/2025    ALBUMIN 4.5 01/30/2025    AST 29 01/30/2025    ALT 17 01/30/2025       No results for input(s): \"APTT\", \"INR\", \"PTT\" in the last 78505 hours.    Lab Results   Component Value Date    IRON 128 01/30/2025    TIBC 499 01/30/2025    FERRITIN 557.00 (H) 01/30/2025       No results found for: \"FOLATE\"    No results found for: \"OCCULTBLD\"    No results found for: \"RETICCTPCT\"  No results found for: \"ZEIZIMEQ61\"  No results found for: \"SPEP\", \"UPEP\"  No results found " "for: \"LDH\", \"URICACID\"  Lab Results   Component Value Date    SEDRATE 5 01/30/2025     No results found for: \"FIBRINOGEN\", \"HAPTOGLOBIN\"  No results found for: \"PTT\", \"INR\"  No results found for: \"\"  No results found for: \"CEA\"  No components found for: \"CA-19-9\"  No results found for: \"PSA\"  Lab Results   Component Value Date    SEDRATE 5 01/30/2025       Assessment & Plan       44 y.o. male with past medical history significant for hypertension complicated by hypertensive urgency, hyperlipidemia, elevated LFTs, with elevated hemoglobin and elevated ferritin presenting for consultation for elevated ferritin level.    1. elevated ferritin level; 9/2024 in setting of illness, still present a few months later.  -No etoh history (very rare glass of wine)  -No family hx if HH  -ferritin could be an acute phase reactant.   -1/30/2025 CBC with WBC 6.37 (normal differential) with hemoglobin 14.3/hematocrit 45.1 with normal indices, platelets 243K, CMP within normal limits, iron 128 (N), iron saturation 26% (N) with normal transferrin and TIBC, ferritin elevated at 557 ng/mL, with CRP normal at 0.36, ESR normal at 5 mm/h, and hepatitis B surface antigen negative, surface antibody negative, core total antibody negative susceptible (never infected and no evidence of vaccination)    - Next step would be either trending the iron profile/ferritin 1-2 more times to ensure that the ferritin is downtrending, if iron/iron saturation is elevated with ferritin rising, would then require HFE testing if confirmed iron overload, followed by therapeutic phlebotomy per protocol.  Patient understands and believes trending iron profile/ferritin is reasonable given the fact that it is downtrending and his liver tests have normalized (twice)    RTC in approximately 4 months with CBC, iron profile and ferritin a few days prior to review    2. Recent diagnosis of non-alcoholic fatty liver  -pt already intentionally losing weight with " stopping pop, eating smaller portions, more active, now down to acceptable BMI 31.2  -9/2024 hep C ab nonreactive  -will hep B revealed no latent infection but no prior vaccination, discussed with patient and he reports his mother did not get him vaccinated as a child.  Discussed with him given this recent diagnosis of fatty liver he needs to talk about his PCP possibly getting vaccinated so there is no possibility of 1 more chronic insults that could be happening to his liver over time.  Patient verbalized understanding      Thank you very much for providing the opportunity to participate in this patient’s care. Please do not hesitate to call if there are any other questions.

## 2025-03-03 ENCOUNTER — OFFICE VISIT (OUTPATIENT)
Dept: ONCOLOGY | Facility: CLINIC | Age: 45
End: 2025-03-03
Payer: COMMERCIAL

## 2025-03-03 ENCOUNTER — OFFICE VISIT (OUTPATIENT)
Dept: FAMILY MEDICINE CLINIC | Facility: CLINIC | Age: 45
End: 2025-03-03
Payer: COMMERCIAL

## 2025-03-03 VITALS
BODY MASS INDEX: 31.53 KG/M2 | OXYGEN SATURATION: 100 % | HEIGHT: 66 IN | HEART RATE: 50 BPM | DIASTOLIC BLOOD PRESSURE: 92 MMHG | WEIGHT: 196.2 LBS | SYSTOLIC BLOOD PRESSURE: 148 MMHG | TEMPERATURE: 98 F

## 2025-03-03 VITALS
DIASTOLIC BLOOD PRESSURE: 88 MMHG | OXYGEN SATURATION: 99 % | SYSTOLIC BLOOD PRESSURE: 142 MMHG | HEIGHT: 66 IN | BODY MASS INDEX: 31.18 KG/M2 | HEART RATE: 61 BPM | WEIGHT: 194 LBS

## 2025-03-03 DIAGNOSIS — I10 PRIMARY HYPERTENSION: Primary | ICD-10-CM

## 2025-03-03 DIAGNOSIS — R79.89 ELEVATED FERRITIN LEVEL: ICD-10-CM

## 2025-03-03 DIAGNOSIS — E78.2 MIXED HYPERLIPIDEMIA: ICD-10-CM

## 2025-03-03 DIAGNOSIS — R79.89 ELEVATED FERRITIN: Primary | ICD-10-CM

## 2025-03-03 DIAGNOSIS — D58.2 ELEVATED HEMOGLOBIN: ICD-10-CM

## 2025-03-03 PROCEDURE — 99213 OFFICE O/P EST LOW 20 MIN: CPT | Performed by: INTERNAL MEDICINE

## 2025-03-03 PROCEDURE — 99213 OFFICE O/P EST LOW 20 MIN: CPT | Performed by: NURSE PRACTITIONER

## 2025-03-03 RX ORDER — LOSARTAN POTASSIUM 50 MG/1
50 TABLET ORAL
Qty: 90 TABLET | Refills: 3 | Status: SHIPPED | OUTPATIENT
Start: 2025-03-03

## 2025-03-03 NOTE — PROGRESS NOTES
"Chief Complaint  Follow-up (3 month follow up HTN )    Subjective        Smith Fletcher presents to [facility]  History of Present Illness    Patient presents for follow-up visit regarding hypertension. He is prescribed amlodipine 10 mg daily, losartan 50 mg daily and metoprolol XL 50 mg daily. Patient reports at home, reports always < 140/90. Patient is taking Fenofibrate 145 mg daily for hyperlipidemia. Patient denies dizziness, headache, vision changes, chest pain, edema,cough or dyspnea.     Hx elevated hemoglobin/ferritin, followed by Hematology.     Objective   Vital Signs:  /88 (BP Location: Left arm, Patient Position: Sitting, Cuff Size: Adult)   Pulse 61   Ht 167.6 cm (66\")   Wt 88 kg (194 lb)   SpO2 99%   BMI 31.31 kg/m²   Estimated body mass index is 31.31 kg/m² as calculated from the following:    Height as of this encounter: 167.6 cm (66\").    Weight as of this encounter: 88 kg (194 lb).            Physical Exam  Constitutional:       Appearance: Normal appearance.   HENT:      Head: Normocephalic.   Cardiovascular:      Rate and Rhythm: Normal rate and regular rhythm.   Pulmonary:      Effort: Pulmonary effort is normal.      Breath sounds: Normal breath sounds.   Abdominal:      General: Abdomen is flat. Bowel sounds are normal.      Palpations: Abdomen is soft.   Musculoskeletal:         General: Normal range of motion.      Cervical back: Neck supple.      Right lower leg: No edema.      Left lower leg: No edema.   Skin:     General: Skin is warm and dry.   Neurological:      Mental Status: He is alert and oriented to person, place, and time.      Gait: Gait is intact.   Psychiatric:         Attention and Perception: Attention normal.         Mood and Affect: Mood normal.         Speech: Speech normal.        Result Review :    CMP          9/16/2024    15:39 12/2/2024    13:51 1/30/2025    10:19   CMP   Glucose 91  74  82    BUN 17  21  15    Creatinine " 1.01  1.27  1.17    EGFR 94.6  71.9  79.3    Sodium 139  139  138    Potassium 4.2  3.9  4.2    Chloride 103  102  105    Calcium 9.9  9.6  9.9    Total Protein 7.8  8.1  7.7    Albumin 4.6  4.3  4.5    Globulin 3.2  3.8  3.2    Total Bilirubin 0.8  0.5  0.6    Alkaline Phosphatase 87  59  53    AST (SGOT) 41  25  29    ALT (SGPT) 57  16  17    Albumin/Globulin Ratio 1.4  1.1  1.4    BUN/Creatinine Ratio 16.8  16.5  12.8    Anion Gap 11.0  11.8  8.1      CBC          9/16/2024    15:39 12/2/2024    13:51 1/30/2025    10:19   CBC   WBC 8.62  7.19  6.37    RBC 6.05  5.33  5.05    Hemoglobin 18.0  15.6  15.3    Hematocrit 52.1  47.2  45.1    MCV 86.1  88.6  89.3    MCH 29.8  29.3  30.3    MCHC 34.5  33.1  33.9    RDW 12.6  13.0  12.4    Platelets 356  268  243      Lipid Panel          9/16/2024    15:39 12/2/2024    13:51   Lipid Panel   Total Cholesterol 230  204    Triglycerides 142  158    HDL Cholesterol 30  33    VLDL Cholesterol 26  29    LDL Cholesterol  174  142    LDL/HDL Ratio 5.72  4.22      TSH          9/9/2024    20:32 9/16/2024    15:39   TSH   TSH 1.700  1.660      Most Recent A1C          9/16/2024    15:39   HGBA1C Most Recent   Hemoglobin A1C 5.50      Data reviewed : Consultant notes Hematology           Assessment and Plan   Diagnoses and all orders for this visit:    1. Primary hypertension (Primary)  Assessment & Plan:  Hypertension is stable and controlled  Continue current treatment regimen.  Dietary sodium restriction.  Regular aerobic exercise.  Ambulatory blood pressure monitoring.  Blood pressure will be reassessed in 6 months.    Orders:  -     losartan (COZAAR) 50 MG tablet; Take 1 tablet by mouth Daily.  Dispense: 90 tablet; Refill: 3    2. Mixed hyperlipidemia  Assessment & Plan:   Lipid abnormalities are improving with treatment    Plan:  Continue same medication/s without change.      Discussed medication dosage, use, side effects, and goals of treatment in detail.    Counseled  patient on lifestyle modifications to help control hyperlipidemia.   Advised patient to exercise for 150 minutes weekly. (30 minute brisk walk, 5 days a week for example)    Patient Treatment Goals:   LDL goal is under 100    Followup in 6 months.      3. Elevated ferritin level    4. Elevated hemoglobin  Assessment & Plan:  Reviewed Hematology visit note, follow up as instructed             I spent 25 minutes caring for Smith on this date of service. This time includes time spent by me in the following activities:preparing for the visit, reviewing tests, obtaining and/or reviewing a separately obtained history, performing a medically appropriate examination and/or evaluation , counseling and educating the patient/family/caregiver, ordering medications, tests, or procedures, documenting information in the medical record, and care coordination  Follow Up   Return in about 6 months (around 9/3/2025) for Annual physical.  Patient was given instructions and counseling regarding his condition or for health maintenance advice. Please see specific information pulled into the AVS if appropriate.

## 2025-03-03 NOTE — PATIENT INSTRUCTIONS
We are thinking the ferritin elevated was from COVID/infection in Sept- no elevated iron/iron sat and ferritin is slowly going down. Continue to monitor      RTC 4 mo with labs a few days prior

## 2025-03-03 NOTE — ASSESSMENT & PLAN NOTE
Lipid abnormalities are improving with treatment    Plan:  Continue same medication/s without change.      Discussed medication dosage, use, side effects, and goals of treatment in detail.    Counseled patient on lifestyle modifications to help control hyperlipidemia.   Advised patient to exercise for 150 minutes weekly. (30 minute brisk walk, 5 days a week for example)    Patient Treatment Goals:   LDL goal is under 100    Followup in 6 months.

## 2025-05-04 DIAGNOSIS — I16.0 HYPERTENSIVE URGENCY: ICD-10-CM

## 2025-05-04 DIAGNOSIS — E78.2 MIXED HYPERLIPIDEMIA: ICD-10-CM

## 2025-05-05 RX ORDER — AMLODIPINE BESYLATE 10 MG/1
10 TABLET ORAL
Qty: 90 TABLET | Refills: 0 | Status: SHIPPED | OUTPATIENT
Start: 2025-05-05

## 2025-05-05 RX ORDER — METOPROLOL SUCCINATE 25 MG/1
50 TABLET, EXTENDED RELEASE ORAL
Qty: 180 TABLET | Refills: 0 | Status: SHIPPED | OUTPATIENT
Start: 2025-05-05 | End: 2025-08-03

## 2025-05-05 RX ORDER — FENOFIBRATE 145 MG/1
145 TABLET, FILM COATED ORAL DAILY
Qty: 90 TABLET | Refills: 1 | Status: SHIPPED | OUTPATIENT
Start: 2025-05-05

## 2025-06-18 DIAGNOSIS — I10 PRIMARY HYPERTENSION: ICD-10-CM

## 2025-06-18 RX ORDER — LOSARTAN POTASSIUM 50 MG/1
50 TABLET ORAL
Qty: 90 TABLET | Refills: 3 | OUTPATIENT
Start: 2025-06-18

## 2025-06-20 NOTE — PROGRESS NOTES
HEMATOLOGY ONCOLOGY OUTPATIENT FOLLOW UP       Patient name: Smith Fletcher  : 1980  MRN: [id number]  Primary Care Physician: Rayna Nichols APRN  Referring Physician: Rayna Nichols AP*  Reason For Consult: elevated ferritin level      History of Present Illness:  Patient is a 44 y.o. male with past medical history significant for hypertension complicated by hypertensive urgency, hyperlipidemia, elevated LFTs, with elevated hemoglobin and elevated ferritin presenting for consultation for elevated ferritin level.    -2024  pt ended up in hospital for hypertensive urgency, feels he might have been exposed to COVID around then.  -2024 CBC showing WBC 10.81, hemoglobin 16.9/hematocrit 48.4 with normal indices, platelets 287K, CMP within normal limits with creatinine 1.01 and AST 41/ALT 57 (H).  Ferritin 648 ng/mL (H). Hep C Ab negative, TSH normal.  -10/3/2024 US liver: hepatic echogenicity is diffusely increased felt to represent hepatic steatosis. no focal hepatic masses.     -2024 CBC showing WBC 7.19, hemoglobin 15.6/hematocrit 47.2 with normal indices, platelets 268K, CMP within normal limits with creatinine 1.27.  Ferritin 662 ng/mL (H).    -2025 Patient presents for initial consultation some fatigue in 2024 thought it was medication but he has been better, and years of achy non-swollen joints (neck, back, left knee, b/l hands/fingers) denies rashes. He denies unintentional weight loss (has intentionally lost 30# in last 4 mo with diet change, stopped soft drinks (60 oz to 0) and increased activity), denies fevers, chills, drenching night sweats, lymphadenopathy.   -No family history of hemachromatosis, early liver failure, early heart failure. Mom with [place] CRUZ but is overweight.    -2025 CBC with WBC 6.37 (normal differential) with hemoglobin 14.3/hematocrit 45.1 with normal indices, platelets 243K, CMP within normal limits, iron 128 (N), iron saturation 26% (N) with  normal transferrin and TIBC, ferritin elevated at 557 ng/mL, with CRP normal at 0.36, ESR normal at 5 mm/h, and hepatitis B surface antigen negative, surface antibody negative, core total antibody negative susceptible (never infected and no evidence of vaccination)    -3/3/25 Patient presents for follow-up still with fatigue, years of achy non-swollen joints (neck, back, left knee, b/l hands/fingers) denies rashes, to review his labs from consult.  His family and him have recently changed their diet due to his hypertension, otherwise no new issues.  -He is reporting back in September when he was sent in with hypertensive urgency, he was actually being seen at the PCP because of recent COVID diagnosis a few months before and also it had chronic sinus issues/possible sinus infection.      Subjective:  -7/8/2025 CBC with WBC 6.45 (normal differential), hemoglobin 16.0/hematocrit 47.0 with normal indices and platelets of 243K, iron 145, iron saturation 29% with normal indices otherwise, ferritin 495 (decreased from 5 months ago which was 557)    -7/16/25 Patient presents for follow-up still with chronic fatigue, chronic nonprogressive years of achy non-swollen joints (neck, back, left knee, b/l hands/fingers) and continues to deny rashes.  He has gained 20 of the original 45 pounds lost back due to traveling for his job and having inconsistent availability of healthy meals.  No new issues. Sees PCP in Sept for annual.      Past Medical History:   Diagnosis Date    Headache     Hyperlipidemia 09/2024    Hypertension        Past Surgical History:   Procedure Laterality Date    WISDOM TOOTH EXTRACTION           Current Outpatient Medications:     amLODIPine (NORVASC) 10 MG tablet, Take 1 tablet by mouth Daily., Disp: 90 tablet, Rfl: 0    fenofibrate (TRICOR) 145 MG tablet, Take 1 tablet by mouth Daily., Disp: 90 tablet, Rfl: 1    losartan (COZAAR) 50 MG tablet, Take 1 tablet by mouth Daily., Disp: 90 tablet, Rfl: 3     metoprolol succinate XL (TOPROL-XL) 25 MG 24 hr tablet, Take 2 tablets by mouth Daily for 90 days., Disp: 180 tablet, Rfl: 0    albuterol sulfate  (90 Base) MCG/ACT inhaler, Inhale 2 puffs Every 4 (Four) Hours As Needed for Wheezing. (Patient not taking: Reported on 7/16/2025), Disp: 8 g, Rfl: 1    Allergies   Allergen Reactions    Codeine Nausea And Vomiting       Family History   Problem Relation Age of Onset    Hypertension Mother     Other (nonalcoholic fatty liver) Mother     Hyperlipidemia Mother     Hypertension Father     Hyperlipidemia Father     Hypertension Maternal Grandmother     Stroke Maternal Grandmother     Coronary artery disease Maternal Grandmother     Diabetes Maternal Grandmother        Cancer-related family history is not on file.    Social History     Tobacco Use    Smoking status: Never     Passive exposure: Never    Smokeless tobacco: Never   Vaping Use    Vaping status: Never Used   Substance Use Topics    Alcohol use: Not Currently     Comment: hx of very rare glass of wine prior    Drug use: Never     Social History     Social History Narrative    Not on file      Review of Systems:  Constitutional: +has gained some weight back (had lost 45 pounds, in interim gained 20) denies any weakness, fatigue, lack of appetite, excessive appetite chills or fever.  Eyes: Reports no blurry vision, no double vision, no pain in the eyes, dry eyes or tearing.  ENT: Reports no decreased hearing capacity, ear pain or tinnitus. Denies any epistaxis, nasal congestion, mouth sores or bleeding, tooth or jaw pain, sore throat or hoarseness.  Respiratory: Denies any cough, sputum production or hemoptysis. There is no wheezing, shortness of breath or any other respiratory complaints.  Cardiovascular: Denies any chest pain, shortness of breath when lying down, shortness of breath with activity, palpitations, or leg swelling.  Breasts: Denies any lumps, bumps, pain, nipple changes or discharge in the  "breasts.  Gastrointestinal: There are no complaints of abdominal pain, difficulty swallowing or painful swallowing, anorexia, nausea, vomiting, diarrhea, constipation, heartburn, blood in the stools, dark stools, or change in bowel habits or hemorrhoids.  Genitourinary: Denies any pain or burning on urination, blood in the urine or frequent urination. Denies erectile dysfunction.  Musculoskeletal: + chronic painful joints (neck, back, left knee, b/l hands/fingers)-stable, Denies swelling of the joints, muscle or back pain. Denies any pain or tingling in the legs, hands or feet.  Neuropsychiatric: Denies any nervousness, depressed mood, headaches, blackouts, dizziness, weakness of limbs, loss of sensation, loss of balance, loss of coordination or difficulty in speaking.  Cutaneous: Denies any dry skin, itching, rash, change in the color of the skin, or any other cutaneous complaints.  Hematologic/Lymphatic: Denies any bruising or bleeding. Report no recent development of palpable or painful lymph nodes.  Allergy/Immunology: Denies rash/hayfever symptoms or any recent history of pneumonia, recurrent sinusitis, urinary infection or any other history of an ongoing infection.  Endocrine: Reports no intolerance to heat or cold, excessive thirst or excessive urination.    Objective:  Vital signs:  Vitals:    25 0851   BP: 153/98   Pulse: 55   Temp: 97.9 °F (36.6 °C)   TempSrc: Oral   SpO2: 100%   Weight: 97.4 kg (214 lb 12.8 oz)   Height: 167.6 cm (65.98\")   PainSc: 0-No pain     Body mass index is 34.69 kg/m².      Physical Exam:   ECO  GENERAL: The patient is a pleasant middle aged white male who appears their stated age and is awake, in no acute distress.  SKIN: No rash or ulcers.   HEME/LYMPHATICS: No bruising or petechiae on visual inspection. No lymphadenopathy on visual inspection of cervical, supraclavicular, infraclavicular lymph nodes.  HEAD/FACE: atraumatic and normocephalic. Normal hair.  EYES: " PERRLA/EOMI. No scleral icterus. No tearing or dry eyes.  ENT: External ears normal with no evidence of discharge. No evidence of ulceration or bleeding in the nostrils. Mouth has no ulcers, bleeding or inflammation of the gums, floor or roof of the mouth with normal dentition for age.  NECK: Supple, symmetric.   CHEST/RESPIRATORY: Expansion maintained bilaterally and symmetrically. On auscultation, clear breath sounds in both lungs. No wheezes, rhonchi or rales.  BREAST: Deferred.  CARDIOVASCULAR: On auscultation, regular rate and rhythm. No murmurs, gallops or rubs are heard.  GASTROINTESTINAL/ABDOMEN: Symmetric. On auscultation of the abdomen, there are normal bowel sounds in all four quadrants. Non-tender to palpation.  GENITOURINARY: Deferred.  NEUROLOGIC: Alert and oriented time, person, and place, with no apparent changes in recent or remote memory. Cranial nerves II-XII are grossly intact. Sensation is maintained in the extremities. Strength and tone appear normal for age and equal in the extremities. Gait is normal.  MUSCULOSKELETAL: No cyanosis, clubbing or edema in the extremities, no swelling or redness of the hand or finger joints. There are no surgical scars on the extremities.  PSYCHIATRIC: The patient maintains judgment and has good insight. The patient has no changes in mood or affection.     Lab Results - Last 18 Months   Lab Units 07/08/25  0858 01/30/25  1019 12/02/24  1351   WBC 10*3/mm3 6.45 6.37 7.19   HEMOGLOBIN g/dL 16.0 15.3 15.6   HEMATOCRIT % 47.0 45.1 47.2   PLATELETS 10*3/mm3 243 243 268   MCV fL 87.9 89.3 88.6     Lab Results - Last 18 Months   Lab Units 01/30/25  1019 12/02/24  1351 09/16/24  1539   SODIUM mmol/L 138 139 139   POTASSIUM mmol/L 4.2 3.9 4.2   CHLORIDE mmol/L 105 102 103   CO2 mmol/L 24.9 25.2 25.0   BUN mg/dL 15 21* 17   CREATININE mg/dL 1.17 1.27 1.01   CALCIUM mg/dL 9.9 9.6 9.9   BILIRUBIN mg/dL 0.6 0.5 0.8   ALK PHOS U/L 53 59 87   ALT (SGPT) U/L 17 16 57*   AST  "(SGOT) U/L 29 25 41*   GLUCOSE mg/dL 82 74 91       Lab Results   Component Value Date    GLUCOSE 82 01/30/2025    BUN 15 01/30/2025    CREATININE 1.17 01/30/2025    BCR 12.8 01/30/2025    K 4.2 01/30/2025    CO2 24.9 01/30/2025    CALCIUM 9.9 01/30/2025    ALBUMIN 4.5 01/30/2025    AST 29 01/30/2025    ALT 17 01/30/2025       No results for input(s): \"APTT\", \"INR\", \"PTT\" in the last 21919 hours.    Lab Results   Component Value Date    IRON 145 07/08/2025    TIBC 501 07/08/2025    FERRITIN 495.00 (H) 07/08/2025       No results found for: \"FOLATE\"    No results found for: \"OCCULTBLD\"    No results found for: \"RETICCTPCT\"  No results found for: \"VHZSCBRP46\"  No results found for: \"SPEP\", \"UPEP\"  No results found for: \"LDH\", \"URICACID\"  Lab Results   Component Value Date    SEDRATE 5 01/30/2025     No results found for: \"FIBRINOGEN\", \"HAPTOGLOBIN\"  No results found for: \"PTT\", \"INR\"  No results found for: \"\"  No results found for: \"CEA\"  No components found for: \"CA-19-9\"  No results found for: \"PSA\"  Lab Results   Component Value Date    SEDRATE 5 01/30/2025       Assessment & Plan   44 y.o. male with past medical history significant for hypertension complicated by hypertensive urgency, hyperlipidemia, elevated LFTs, with elevated hemoglobin and elevated ferritin presenting for consultation for elevated ferritin level.    1. elevated ferritin level; 9/2024 in setting of illness, still present a few months later.  -No etoh history (very rare glass of wine)  -No family hx if HH  -ferritin could be an acute phase reactant.   -1/30/2025 CBC with WBC 6.37 (normal differential) with hemoglobin 14.3/hematocrit 45.1 with normal indices, platelets 243K, CMP within normal limits, iron 128 (N), iron saturation 26% (N) with normal transferrin and TIBC, ferritin elevated at 557 ng/mL, with CRP normal at 0.36, ESR normal at 5 mm/h, and hepatitis B surface antigen negative, surface antibody negative, core total antibody " negative susceptible (never infected and no evidence of vaccination)  - Next step would be either trending the iron profile/ferritin 1-2 more times to ensure that the ferritin is downtrending, if iron/iron saturation is elevated with ferritin rising, would then require HFE testing if confirmed iron overload, followed by therapeutic phlebotomy per protocol.  Patient understands and believes trending iron profile/ferritin is reasonable given the fact that it is downtrending and his liver tests have normalized (twice)  -7/8/2025 CBC with WBC 6.45 (normal differential), hemoglobin 16.0/hematocrit 47.0 with normal indices and platelets of 243K, iron 145, iron saturation 29% with normal indices otherwise, ferritin 495 (decreased from 5 months ago which was 557)  -7/2025 discussed with pt ferritin is lower than prior, patient agrees no new symptoms, is happy with decreased levels.  RTC in approximately 6 months with CBC, iron profile and ferritin a few days prior to review      2. Recent diagnosis of non-alcoholic fatty liver  -pt already intentionally losing weight with stopping pop, eating smaller portions, more active, now down to acceptable BMI 31.2  -9/2024 hep C ab nonreactive  -1/2025 hep B testing revealed no latent infection but no prior vaccination, discussed with patient and he reports his mother did not get him vaccinated as a child.  Discussed with him given this recent diagnosis of fatty liver he needs to talk about his PCP possibly getting vaccinated so there is no possibility of 1 more chronic insults that could be happening to his liver over time.  Patient verbalized understanding        Thank you very much for providing the opportunity to participate in this patient’s care. Please do not hesitate to call if there are any other questions.

## 2025-07-08 ENCOUNTER — LAB (OUTPATIENT)
Dept: LAB | Facility: HOSPITAL | Age: 45
End: 2025-07-08
Payer: COMMERCIAL

## 2025-07-08 DIAGNOSIS — R79.89 ELEVATED FERRITIN: ICD-10-CM

## 2025-07-08 LAB
BASOPHILS # BLD AUTO: 0.02 10*3/MM3 (ref 0–0.2)
BASOPHILS NFR BLD AUTO: 0.3 % (ref 0–1.5)
DEPRECATED RDW RBC AUTO: 38.5 FL (ref 37–54)
EOSINOPHIL # BLD AUTO: 0.18 10*3/MM3 (ref 0–0.4)
EOSINOPHIL NFR BLD AUTO: 2.8 % (ref 0.3–6.2)
ERYTHROCYTE [DISTWIDTH] IN BLOOD BY AUTOMATED COUNT: 12.1 % (ref 12.3–15.4)
FERRITIN SERPL-MCNC: 495 NG/ML (ref 30–400)
HCT VFR BLD AUTO: 47 % (ref 37.5–51)
HGB BLD-MCNC: 16 G/DL (ref 13–17.7)
IRON 24H UR-MRATE: 145 MCG/DL (ref 59–158)
IRON SATN MFR SERPL: 29 % (ref 20–50)
LYMPHOCYTES # BLD AUTO: 1.47 10*3/MM3 (ref 0.7–3.1)
LYMPHOCYTES NFR BLD AUTO: 22.8 % (ref 19.6–45.3)
MCH RBC QN AUTO: 29.9 PG (ref 26.6–33)
MCHC RBC AUTO-ENTMCNC: 34 G/DL (ref 31.5–35.7)
MCV RBC AUTO: 87.9 FL (ref 79–97)
MONOCYTES # BLD AUTO: 0.59 10*3/MM3 (ref 0.1–0.9)
MONOCYTES NFR BLD AUTO: 9.1 % (ref 5–12)
NEUTROPHILS NFR BLD AUTO: 4.19 10*3/MM3 (ref 1.7–7)
NEUTROPHILS NFR BLD AUTO: 65 % (ref 42.7–76)
PLATELET # BLD AUTO: 243 10*3/MM3 (ref 140–450)
PMV BLD AUTO: 9.7 FL (ref 6–12)
RBC # BLD AUTO: 5.35 10*6/MM3 (ref 4.14–5.8)
TIBC SERPL-MCNC: 501 MCG/DL (ref 298–536)
TRANSFERRIN SERPL-MCNC: 336 MG/DL (ref 200–360)
WBC NRBC COR # BLD AUTO: 6.45 10*3/MM3 (ref 3.4–10.8)

## 2025-07-08 PROCEDURE — 36415 COLL VENOUS BLD VENIPUNCTURE: CPT

## 2025-07-08 PROCEDURE — 85025 COMPLETE CBC W/AUTO DIFF WBC: CPT

## 2025-07-08 PROCEDURE — 83540 ASSAY OF IRON: CPT | Performed by: INTERNAL MEDICINE

## 2025-07-08 PROCEDURE — 84466 ASSAY OF TRANSFERRIN: CPT | Performed by: INTERNAL MEDICINE

## 2025-07-08 PROCEDURE — 82728 ASSAY OF FERRITIN: CPT | Performed by: INTERNAL MEDICINE

## 2025-07-16 ENCOUNTER — OFFICE VISIT (OUTPATIENT)
Dept: ONCOLOGY | Facility: CLINIC | Age: 45
End: 2025-07-16
Payer: COMMERCIAL

## 2025-07-16 VITALS
HEIGHT: 66 IN | BODY MASS INDEX: 34.52 KG/M2 | TEMPERATURE: 97.9 F | DIASTOLIC BLOOD PRESSURE: 98 MMHG | WEIGHT: 214.8 LBS | HEART RATE: 55 BPM | OXYGEN SATURATION: 100 % | SYSTOLIC BLOOD PRESSURE: 153 MMHG

## 2025-07-16 DIAGNOSIS — Z11.59 NEED FOR HEPATITIS B SCREENING TEST: ICD-10-CM

## 2025-07-16 DIAGNOSIS — R79.89 ELEVATED FERRITIN: Primary | ICD-10-CM

## 2025-07-16 NOTE — PATIENT INSTRUCTIONS
RTC in 6 months with labs a few days prior to MD, please call if any worsening anemia symptoms or noticeable bleeding/bruising in interim

## 2025-08-01 RX ORDER — METOPROLOL SUCCINATE 25 MG/1
50 TABLET, EXTENDED RELEASE ORAL
Qty: 180 TABLET | Refills: 0 | Status: SHIPPED | OUTPATIENT
Start: 2025-08-01 | End: 2025-10-30

## 2025-08-06 DIAGNOSIS — E78.2 MIXED HYPERLIPIDEMIA: ICD-10-CM

## 2025-08-06 RX ORDER — FENOFIBRATE 145 MG/1
145 TABLET, FILM COATED ORAL DAILY
Qty: 90 TABLET | Refills: 1 | Status: SHIPPED | OUTPATIENT
Start: 2025-08-06